# Patient Record
Sex: MALE | Race: WHITE | Employment: OTHER | URBAN - METROPOLITAN AREA
[De-identification: names, ages, dates, MRNs, and addresses within clinical notes are randomized per-mention and may not be internally consistent; named-entity substitution may affect disease eponyms.]

---

## 2022-11-16 ENCOUNTER — APPOINTMENT (OUTPATIENT)
Dept: NON INVASIVE DIAGNOSTICS | Age: 47
DRG: 872 | End: 2022-11-16
Attending: STUDENT IN AN ORGANIZED HEALTH CARE EDUCATION/TRAINING PROGRAM

## 2022-11-16 ENCOUNTER — APPOINTMENT (OUTPATIENT)
Dept: CT IMAGING | Age: 47
DRG: 872 | End: 2022-11-16
Attending: STUDENT IN AN ORGANIZED HEALTH CARE EDUCATION/TRAINING PROGRAM

## 2022-11-16 ENCOUNTER — HOSPITAL ENCOUNTER (INPATIENT)
Age: 47
LOS: 6 days | Discharge: HOME | DRG: 872 | End: 2022-11-22
Attending: STUDENT IN AN ORGANIZED HEALTH CARE EDUCATION/TRAINING PROGRAM | Admitting: HOSPITALIST

## 2022-11-16 DIAGNOSIS — L03.116 CELLULITIS OF LEFT LOWER EXTREMITY: Primary | ICD-10-CM

## 2022-11-16 PROBLEM — L03.90 CELLULITIS: Status: ACTIVE | Noted: 2022-11-16

## 2022-11-16 LAB
ANION GAP SERPL CALC-SCNC: 8 MMOL/L (ref 5–15)
BASOPHILS # BLD: 0 K/UL (ref 0–0.1)
BASOPHILS NFR BLD: 0 % (ref 0–1)
BUN SERPL-MCNC: 14 MG/DL (ref 6–20)
BUN/CREAT SERPL: 18 (ref 12–20)
CA-I BLD-MCNC: 8.9 MG/DL (ref 8.5–10.1)
CHLORIDE SERPL-SCNC: 101 MMOL/L (ref 97–108)
CO2 SERPL-SCNC: 26 MMOL/L (ref 21–32)
CREAT SERPL-MCNC: 0.78 MG/DL (ref 0.7–1.3)
DIFFERENTIAL METHOD BLD: ABNORMAL
EOSINOPHIL # BLD: 0 K/UL (ref 0–0.4)
EOSINOPHIL NFR BLD: 0 % (ref 0–7)
ERYTHROCYTE [DISTWIDTH] IN BLOOD BY AUTOMATED COUNT: 12.4 % (ref 11.5–14.5)
GLUCOSE BLD STRIP.AUTO-MCNC: 104 MG/DL (ref 65–100)
GLUCOSE BLD STRIP.AUTO-MCNC: 115 MG/DL (ref 65–100)
GLUCOSE SERPL-MCNC: 128 MG/DL (ref 65–100)
HCT VFR BLD AUTO: 43.6 % (ref 36.6–50.3)
HGB BLD-MCNC: 15.1 G/DL (ref 12.1–17)
IMM GRANULOCYTES # BLD AUTO: 0.2 K/UL (ref 0–0.04)
IMM GRANULOCYTES NFR BLD AUTO: 1 % (ref 0–0.5)
LACTATE SERPL-SCNC: 1.4 MMOL/L (ref 0.4–2)
LYMPHOCYTES # BLD: 1.1 K/UL (ref 0.8–3.5)
LYMPHOCYTES NFR BLD: 5 % (ref 12–49)
MCH RBC QN AUTO: 29.9 PG (ref 26–34)
MCHC RBC AUTO-ENTMCNC: 34.6 G/DL (ref 30–36.5)
MCV RBC AUTO: 86.3 FL (ref 80–99)
MONOCYTES # BLD: 1.6 K/UL (ref 0–1)
MONOCYTES NFR BLD: 7 % (ref 5–13)
NEUTS SEG # BLD: 18.7 K/UL (ref 1.8–8)
NEUTS SEG NFR BLD: 87 % (ref 32–75)
NRBC # BLD: 0 K/UL (ref 0–0.01)
NRBC BLD-RTO: 0 PER 100 WBC
PERFORMED BY, TECHID: ABNORMAL
PERFORMED BY, TECHID: ABNORMAL
PLATELET # BLD AUTO: 191 K/UL (ref 150–400)
PMV BLD AUTO: 10.9 FL (ref 8.9–12.9)
POTASSIUM SERPL-SCNC: 3.7 MMOL/L (ref 3.5–5.1)
RBC # BLD AUTO: 5.05 M/UL (ref 4.1–5.7)
SODIUM SERPL-SCNC: 135 MMOL/L (ref 136–145)
WBC # BLD AUTO: 21.6 K/UL (ref 4.1–11.1)

## 2022-11-16 PROCEDURE — 74011000636 HC RX REV CODE- 636: Performed by: STUDENT IN AN ORGANIZED HEALTH CARE EDUCATION/TRAINING PROGRAM

## 2022-11-16 PROCEDURE — 96375 TX/PRO/DX INJ NEW DRUG ADDON: CPT

## 2022-11-16 PROCEDURE — 93971 EXTREMITY STUDY: CPT

## 2022-11-16 PROCEDURE — 74011000250 HC RX REV CODE- 250: Performed by: HOSPITALIST

## 2022-11-16 PROCEDURE — 96374 THER/PROPH/DIAG INJ IV PUSH: CPT

## 2022-11-16 PROCEDURE — 80048 BASIC METABOLIC PNL TOTAL CA: CPT

## 2022-11-16 PROCEDURE — 83036 HEMOGLOBIN GLYCOSYLATED A1C: CPT

## 2022-11-16 PROCEDURE — 73701 CT LOWER EXTREMITY W/DYE: CPT

## 2022-11-16 PROCEDURE — 74011000258 HC RX REV CODE- 258: Performed by: HOSPITALIST

## 2022-11-16 PROCEDURE — 83605 ASSAY OF LACTIC ACID: CPT

## 2022-11-16 PROCEDURE — 96361 HYDRATE IV INFUSION ADD-ON: CPT

## 2022-11-16 PROCEDURE — 74011250636 HC RX REV CODE- 250/636: Performed by: HOSPITALIST

## 2022-11-16 PROCEDURE — 85025 COMPLETE CBC W/AUTO DIFF WBC: CPT

## 2022-11-16 PROCEDURE — 65270000029 HC RM PRIVATE

## 2022-11-16 PROCEDURE — 74011250636 HC RX REV CODE- 250/636: Performed by: STUDENT IN AN ORGANIZED HEALTH CARE EDUCATION/TRAINING PROGRAM

## 2022-11-16 PROCEDURE — 99285 EMERGENCY DEPT VISIT HI MDM: CPT

## 2022-11-16 PROCEDURE — 82962 GLUCOSE BLOOD TEST: CPT

## 2022-11-16 PROCEDURE — 36415 COLL VENOUS BLD VENIPUNCTURE: CPT

## 2022-11-16 PROCEDURE — 87040 BLOOD CULTURE FOR BACTERIA: CPT

## 2022-11-16 RX ORDER — MAGNESIUM SULFATE 100 %
4 CRYSTALS MISCELLANEOUS AS NEEDED
Status: DISCONTINUED | OUTPATIENT
Start: 2022-11-16 | End: 2022-11-17 | Stop reason: SDUPTHER

## 2022-11-16 RX ORDER — LISINOPRIL 10 MG/1
10 TABLET ORAL DAILY
Status: DISCONTINUED | OUTPATIENT
Start: 2022-11-17 | End: 2022-11-21

## 2022-11-16 RX ORDER — SODIUM CHLORIDE 0.9 % (FLUSH) 0.9 %
5-40 SYRINGE (ML) INJECTION AS NEEDED
Status: DISCONTINUED | OUTPATIENT
Start: 2022-11-16 | End: 2022-11-22 | Stop reason: HOSPADM

## 2022-11-16 RX ORDER — HYDRALAZINE HYDROCHLORIDE 20 MG/ML
10 INJECTION INTRAMUSCULAR; INTRAVENOUS
Status: DISCONTINUED | OUTPATIENT
Start: 2022-11-16 | End: 2022-11-22 | Stop reason: HOSPADM

## 2022-11-16 RX ORDER — ASCORBIC ACID 250 MG
TABLET ORAL
COMMUNITY

## 2022-11-16 RX ORDER — SODIUM CHLORIDE 0.9 % (FLUSH) 0.9 %
5-40 SYRINGE (ML) INJECTION EVERY 8 HOURS
Status: DISCONTINUED | OUTPATIENT
Start: 2022-11-16 | End: 2022-11-19

## 2022-11-16 RX ORDER — MORPHINE SULFATE 2 MG/ML
2 INJECTION, SOLUTION INTRAMUSCULAR; INTRAVENOUS
Status: DISPENSED | OUTPATIENT
Start: 2022-11-16 | End: 2022-11-18

## 2022-11-16 RX ORDER — ONDANSETRON 2 MG/ML
4 INJECTION INTRAMUSCULAR; INTRAVENOUS
Status: COMPLETED | OUTPATIENT
Start: 2022-11-16 | End: 2022-11-16

## 2022-11-16 RX ORDER — MORPHINE SULFATE 2 MG/ML
2 INJECTION, SOLUTION INTRAMUSCULAR; INTRAVENOUS
Status: DISCONTINUED | OUTPATIENT
Start: 2022-11-16 | End: 2022-11-16

## 2022-11-16 RX ORDER — GLIPIZIDE 5 MG/1
5 TABLET ORAL DAILY
COMMUNITY

## 2022-11-16 RX ORDER — ACETAMINOPHEN 325 MG/1
650 TABLET ORAL
Status: DISCONTINUED | OUTPATIENT
Start: 2022-11-16 | End: 2022-11-22 | Stop reason: HOSPADM

## 2022-11-16 RX ORDER — ATORVASTATIN CALCIUM 20 MG/1
20 TABLET, FILM COATED ORAL DAILY
COMMUNITY

## 2022-11-16 RX ORDER — ONDANSETRON 2 MG/ML
4 INJECTION INTRAMUSCULAR; INTRAVENOUS
Status: DISCONTINUED | OUTPATIENT
Start: 2022-11-16 | End: 2022-11-22 | Stop reason: HOSPADM

## 2022-11-16 RX ORDER — INSULIN LISPRO 100 [IU]/ML
INJECTION, SOLUTION INTRAVENOUS; SUBCUTANEOUS
Status: DISCONTINUED | OUTPATIENT
Start: 2022-11-16 | End: 2022-11-17 | Stop reason: SDUPTHER

## 2022-11-16 RX ORDER — VANCOMYCIN/0.9 % SOD CHLORIDE 1.5G/250ML
1500 PLASTIC BAG, INJECTION (ML) INTRAVENOUS EVERY 12 HOURS
Status: DISCONTINUED | OUTPATIENT
Start: 2022-11-17 | End: 2022-11-21

## 2022-11-16 RX ORDER — ACETAMINOPHEN 650 MG/1
650 SUPPOSITORY RECTAL
Status: DISCONTINUED | OUTPATIENT
Start: 2022-11-16 | End: 2022-11-22 | Stop reason: HOSPADM

## 2022-11-16 RX ORDER — MORPHINE SULFATE 4 MG/ML
4 INJECTION INTRAVENOUS ONCE
Status: COMPLETED | OUTPATIENT
Start: 2022-11-16 | End: 2022-11-16

## 2022-11-16 RX ORDER — AMLODIPINE BESYLATE 5 MG/1
5 TABLET ORAL DAILY
COMMUNITY

## 2022-11-16 RX ORDER — METFORMIN HYDROCHLORIDE 500 MG/1
500 TABLET ORAL
COMMUNITY

## 2022-11-16 RX ORDER — POLYETHYLENE GLYCOL 3350 17 G/17G
17 POWDER, FOR SOLUTION ORAL DAILY PRN
Status: DISCONTINUED | OUTPATIENT
Start: 2022-11-16 | End: 2022-11-22 | Stop reason: HOSPADM

## 2022-11-16 RX ORDER — ONDANSETRON 4 MG/1
4 TABLET, ORALLY DISINTEGRATING ORAL
Status: DISCONTINUED | OUTPATIENT
Start: 2022-11-16 | End: 2022-11-22 | Stop reason: HOSPADM

## 2022-11-16 RX ORDER — LISINOPRIL 40 MG/1
40 TABLET ORAL DAILY
COMMUNITY

## 2022-11-16 RX ADMIN — PIPERACILLIN AND TAZOBACTAM 3.38 G: 3; .375 INJECTION, POWDER, FOR SOLUTION INTRAVENOUS at 17:01

## 2022-11-16 RX ADMIN — MORPHINE SULFATE 4 MG: 4 INJECTION, SOLUTION INTRAMUSCULAR; INTRAVENOUS at 14:24

## 2022-11-16 RX ADMIN — ONDANSETRON 4 MG: 2 INJECTION INTRAMUSCULAR; INTRAVENOUS at 18:16

## 2022-11-16 RX ADMIN — SODIUM CHLORIDE 1000 ML: 9 INJECTION, SOLUTION INTRAVENOUS at 17:01

## 2022-11-16 RX ADMIN — SODIUM CHLORIDE 1000 ML: 9 INJECTION, SOLUTION INTRAVENOUS at 14:22

## 2022-11-16 RX ADMIN — MORPHINE SULFATE 2 MG: 2 INJECTION, SOLUTION INTRAMUSCULAR; INTRAVENOUS at 17:32

## 2022-11-16 RX ADMIN — IOPAMIDOL 100 ML: 755 INJECTION, SOLUTION INTRAVENOUS at 15:14

## 2022-11-16 RX ADMIN — SODIUM CHLORIDE, PRESERVATIVE FREE 10 ML: 5 INJECTION INTRAVENOUS at 17:32

## 2022-11-16 RX ADMIN — VANCOMYCIN HYDROCHLORIDE 2500 MG: 5 INJECTION, POWDER, LYOPHILIZED, FOR SOLUTION INTRAVENOUS at 18:15

## 2022-11-16 RX ADMIN — ONDANSETRON HYDROCHLORIDE 4 MG: 2 SOLUTION INTRAMUSCULAR; INTRAVENOUS at 14:23

## 2022-11-16 RX ADMIN — SODIUM CHLORIDE, PRESERVATIVE FREE 10 ML: 5 INJECTION INTRAVENOUS at 22:43

## 2022-11-16 NOTE — ED TRIAGE NOTES
Started yesterday with lt leg redness and pain, started yesterday with swelling. Pt is a . Hx of DM.

## 2022-11-16 NOTE — H&P
History and Physical    Subjective:   Chief Complaint : left leg reddness since 2 days  Source of information : patient     History of present illness:   47M, very pleasant, h/o DMII and HTN on oral meds with left leg swelling sinbce 2 days    Symptoms started 2 days ago, and worsening slowly, associated with pain- sharp, non radiating and sesation of feeling warm. He was found to be in sepsis on arrival. He is a reuck     ED:  Vancomycin and 1 L bolus    Past Medical History:   Diagnosis Date    Diabetes ([address]     Hypertension      No past surgical history on file. No family history on file. Social History     Tobacco Use    Smoking status: Former     Types: Cigarettes    Smokeless tobacco: Never   Substance Use Topics    Alcohol use: Not on file       Prior to Admission medications    Not on File     No Known Allergies          Review of Systems:  Constitutional: Appetite is good, denies weight loss, no fever, no chills, no night sweats  Eye: No recent visual disturbances, no discharge, no double vision  Ear/nose/mouth/throat : No hearing disturbance, no ear pain, no nasal congestion, no sore throat, no trouble swallowing. Respiratory : No trouble breathing, no cough, no shortness of breath, no hemoptysis, no wheezing  Cardiovascular : No chest pain, no palpitation, no racing of heart, no orthopnea, no paroxysmal nocturnal dyspnea, no peripheral edema  Gastrointestinal : No nausea, no vomiting, no diarrhea, constipation, heartburn, abdominal pain  Genitourinary : No dysuria, no hematuria, no increased frequency, incontinence,  Lymphatics : No swollen glands -Neck, axillary, inguinal  Endocrine : No excessive thirst, no polyuria no cold intolerance, no heat intolerance.   Immunologic : No hives, urticaria, no seasonal allergies,   Musculoskeletal : No joint swelling, pain, effusion,  no back pain, no neck pain,   Integumentary : ++++ leg pain  Hematology : No petechiae, No easy bruising,  No tendency to bleed easy  Neurology : Denies change in mental status, no abnormal balance, no headache, no confusion, numbness, tingling,  Psychiatric : No mood swings, no anxiety, depression    Vitals:   Visit Vitals  /71 (BP 1 Location: Right upper arm, BP Patient Position: At rest)   Pulse (!) 102   Temp 99 °F (37.2 °C)   Resp 20   Ht 6' 1\" (1.854 m)   Wt 154.2 kg (340 lb)   SpO2 98%   BMI 44.86 kg/m²       Physical Exam  Vitals and nursing note reviewed. Constitutional:       General: He is not in acute distress. Appearance: He is not ill-appearing, toxic-appearing or diaphoretic. HENT:      Head: Normocephalic and atraumatic. Cardiovascular:      Rate and Rhythm: Normal rate and regular rhythm. Heart sounds: Normal heart sounds. Pulmonary:      Effort: Pulmonary effort is normal.      Breath sounds: Normal breath sounds. Abdominal:      Palpations: Abdomen is soft. Tenderness: There is no abdominal tenderness. Musculoskeletal:      Cervical back: Normal range of motion and neck supple. Right lower leg: No tenderness. No edema. Left lower leg: No tenderness. No edema. Comments: Redness noted from the mid lower leg down with tenderness to palpation. Patient has tenderness to palpation tracking from the popliteal all the way up to the femoral vein. Tenderness also noted in the calf muscle and mildly anterior to the leg. There is no thigh tenderness anywhere aside from femoral vein. Skin:     General: Skin is warm and dry. Neurological:      Mental Status: He is alert and oriented to person, place, and time. Data Review:   Recent Results (from the past 24 hour(s))   CBC WITH AUTOMATED DIFF    Collection Time: 11/16/22  1:30 PM   Result Value Ref Range    WBC 21.6 (H) 4.1 - 11.1 K/uL    RBC 5.05 4. 10 - 5.70 M/uL    HGB 15.1 12.1 - 17.0 g/dL    HCT 43.6 36.6 - 50.3 %    MCV 86.3 80.0 - 99.0 FL    MCH 29.9 26.0 - 34.0 PG    MCHC 34.6 30.0 - 36.5 g/dL    RDW 12.4 11.5 - 14.5 %    PLATELET 279 693 - 439 K/uL    MPV 10.9 8.9 - 12.9 FL    NRBC 0.0 0.0  WBC    ABSOLUTE NRBC 0.00 0.00 - 0.01 K/uL    NEUTROPHILS 87 (H) 32 - 75 %    LYMPHOCYTES 5 (L) 12 - 49 %    MONOCYTES 7 5 - 13 %    EOSINOPHILS 0 0 - 7 %    BASOPHILS 0 0 - 1 %    IMMATURE GRANULOCYTES 1 (H) 0 - 0.5 %    ABS. NEUTROPHILS 18.7 (H) 1.8 - 8.0 K/UL    ABS. LYMPHOCYTES 1.1 0.8 - 3.5 K/UL    ABS. MONOCYTES 1.6 (H) 0.0 - 1.0 K/UL    ABS. EOSINOPHILS 0.0 0.0 - 0.4 K/UL    ABS. BASOPHILS 0.0 0.0 - 0.1 K/UL    ABS. IMM.  GRANS. 0.2 (H) 0.00 - 0.04 K/UL    DF AUTOMATED     METABOLIC PANEL, BASIC    Collection Time: 11/16/22  1:30 PM   Result Value Ref Range    Sodium 135 (L) 136 - 145 mmol/L    Potassium 3.7 3.5 - 5.1 mmol/L    Chloride 101 97 - 108 mmol/L    CO2 26 21 - 32 mmol/L    Anion gap 8 5 - 15 mmol/L    Glucose 128 (H) 65 - 100 mg/dL    BUN 14 6 - 20 mg/dL    Creatinine 0.78 0.70 - 1.30 mg/dL    BUN/Creatinine ratio 18 12 - 20      eGFR >60 >60 ml/min/1.73m2    Calcium 8.9 8.5 - 10.1 mg/dL   LACTIC ACID    Collection Time: 11/16/22  1:30 PM   Result Value Ref Range    Lactic acid 1.4 0.4 - 2.0 mmol/L             Assessment and Plan :     (1) non-purulent cellulitis     (2) Sepsis     (3) HTN    (4) DMII    PLAN:  Vanc and zosyn  Morphine for pain  US leg to rule out DVT  SSI for insulin'  PRN hydralazine    DISPO: dc home tomrorow      Signed By: Darcy Coe MD     November 16, 2022

## 2022-11-16 NOTE — ED NOTES
TRANSFER - OUT REPORT:    Verbal report given to Glenny(name) on Kiersten Garduno  being transferred to [facility](unit) for routine progression of care       Report consisted of patients Situation, Background, Assessment and   Recommendations(SBAR). Information from the following report(s) SBAR was reviewed with the receiving nurse. Lines:   Peripheral IV 11/16/22 Anterior;Left;Proximal Forearm (Active)        Opportunity for questions and clarification was provided.       Patient transported with:   [employer]

## 2022-11-16 NOTE — PROGRESS NOTES
Vancomycin Dosing Consult  Eileen Reyes is a 56 y.o. male with leg cellulitis / sepsis. Pharmacy was consulted by Dr. Caryl Eid to dose and monitor Vancomycin (for 5 days). Today is day 1. Antibiotic regimen: Vancomycin + Zosyn    Temp (24hrs), Av.6 °F (37 °C), Min:98.2 °F (36.8 °C), Max:99 °F (37.2 °C)    Recent Labs     22  1330   WBC 21.6*     Recent Labs     22  1330   CREA 0.78   BUN 14     Estimated Creatinine Clearance: 183.4 mL/min (based on SCr of 0.78 mg/dL). ml/min  Concomitant nephrotoxic drugs: None    Cultures:    blood: pending    MRSA Swab: Not ordered, patient already received first dose of vancomycin    Target range: AUC/ESTRADA 400-600      Ht Readings from Last 1 Encounters:   22 185.4 cm (73\")     Wt Readings from Last 1 Encounters:   22 154.2 kg (340 lb)     Ideal body weight: 79.9 kg (176 lb 2.4 oz)  Adjusted ideal body weight: 109.6 kg (241 lb 11 oz)        Assessment/Plan:   Afebrile, leukocytosis  Started Vancomycin 2500mg IV x1, then 1500mg IV q12h  Pharmacy will order a random level tomorrow at 1600.   Antimicrobial stop date TBD

## 2022-11-16 NOTE — PROGRESS NOTES
Reason for Admission:  Cellulitis                     RUR Score:  4%                   Plan for utilizing home health:   None @ this time/uses no DME. PCP: First and Last name:  Martha Yap     Name of Practice:    Are you a current patient: Yes/No: Yes   Approximate date of last visit: 2 mos ago. Can you participate in a virtual visit with your PCP: Yes/Call/Has cell phone. Current Advanced Directive/Advance Care Plan: Full Code      Healthcare Decision Maker:     Primary Decision Maker: Gomez El - Ex-Spouse - [phone]                  Transition of Care Plan:                    D/C Plan is home & pt will have [facility] transport. Send Rxs to [facility] upon discharge.

## 2022-11-16 NOTE — ED PROVIDER NOTES
[address]  EMERGENCY DEPARTMENT ENCOUNTER NOTE        Date: 11/16/2022  Patient Name: Ernestina White      History of Presenting Illness     Chief Complaint   Patient presents with    Leg Pain       History Provided By: Patient    HPI: Ernestina White, 56 y.o. male with PMH of DM and HTN who is a long-distance  comes to the ED with 2 days history of lower extremity swelling and redness. Patient report that symptoms started 2 days ago and has been slowly progressing, this associated with sensation of feeling warm. Mild to moderate severity, no known aggravating leaving factors without association symptoms. He is denying any shortness of breath, dull pain, nausea or vomiting. No prior history of DVT or PE. He is not on any anticoagulation. There are no other complaints, changes, or physical findings at this time.     PCP: None    Current Facility-Administered Medications   Medication Dose Route Frequency Provider Last Rate Last Admin    vancomycin (VANCOCIN) 2,500 mg in 0.9% sodium chloride 500 mL IVPB  2,500 mg IntraVENous ONCE Radha Bowers MD        sodium chloride 0.9 % bolus infusion 1,000 mL  1,000 mL IntraVENous ONCE Radha Bowers MD        sodium chloride (NS) flush 5-40 mL  5-40 mL IntraVENous Q8H Emmanuelle Vera MD        sodium chloride (NS) flush 5-40 mL  5-40 mL IntraVENous PRN Sam Lanza MD        acetaminophen (TYLENOL) tablet 650 mg  650 mg Oral Q6H PRN Sam Lanza MD        Or    acetaminophen (TYLENOL) suppository 650 mg  650 mg Rectal Q6H PRN Sam Lanza MD        polyethylene glycol (MIRALAX) packet 17 g  17 g Oral DAILY PRN Sam Lanza MD        ondansetron (ZOFRAN ODT) tablet 4 mg  4 mg Oral Q8H PRN Sam Lanza MD        Or    ondansetron [facility]) injection 4 mg  4 mg IntraVENous Q6H PRN Sam Lanza MD           Past History     Past Medical History:  Past Medical History:   Diagnosis Date    Diabetes ([facility] [address]     Hypertension        Past Surgical History:  No past surgical history on file. Family History:  No family history on file. Social History:  Social History     Tobacco Use    Smoking status: Former     Types: Cigarettes    Smokeless tobacco: Never       Allergies:  No Known Allergies      Review of Systems     Review of Systems    A 10 point review of system was performed and was negative except as noted above in HPI    Physical Exam     Physical Exam  Vitals and nursing note reviewed. Constitutional:       General: He is not in acute distress. Appearance: He is not ill-appearing, toxic-appearing or diaphoretic. HENT:      Head: Normocephalic and atraumatic. Cardiovascular:      Rate and Rhythm: Normal rate and regular rhythm. Heart sounds: Normal heart sounds. Pulmonary:      Effort: Pulmonary effort is normal.      Breath sounds: Normal breath sounds. Abdominal:      Palpations: Abdomen is soft. Tenderness: There is no abdominal tenderness. Musculoskeletal:      Cervical back: Normal range of motion and neck supple. Right lower leg: No tenderness. No edema. Left lower leg: No tenderness. No edema. Comments: Redness noted from the mid lower leg down with tenderness to palpation. Patient has tenderness to palpation tracking from the popliteal all the way up to the femoral vein. Tenderness also noted in the calf muscle and mildly anterior to the leg. There is no thigh tenderness anywhere aside from femoral vein. Skin:     General: Skin is warm and dry. Neurological:      Mental Status: He is alert and oriented to person, place, and time. Lab and Diagnostic Study Results     Labs -     Recent Results (from the past 12 hour(s))   CBC WITH AUTOMATED DIFF    Collection Time: 11/16/22  1:30 PM   Result Value Ref Range    WBC 21.6 (H) 4.1 - 11.1 K/uL    RBC 5.05 4. 10 - 5.70 M/uL    HGB 15.1 12.1 - 17.0 g/dL    HCT 43.6 36.6 - 50.3 % MCV 86.3 80.0 - 99.0 FL    MCH 29.9 26.0 - 34.0 PG    MCHC 34.6 30.0 - 36.5 g/dL    RDW 12.4 11.5 - 14.5 %    PLATELET 385 603 - 295 K/uL    MPV 10.9 8.9 - 12.9 FL    NRBC 0.0 0.0  WBC    ABSOLUTE NRBC 0.00 0.00 - 0.01 K/uL    NEUTROPHILS 87 (H) 32 - 75 %    LYMPHOCYTES 5 (L) 12 - 49 %    MONOCYTES 7 5 - 13 %    EOSINOPHILS 0 0 - 7 %    BASOPHILS 0 0 - 1 %    IMMATURE GRANULOCYTES 1 (H) 0 - 0.5 %    ABS. NEUTROPHILS 18.7 (H) 1.8 - 8.0 K/UL    ABS. LYMPHOCYTES 1.1 0.8 - 3.5 K/UL    ABS. MONOCYTES 1.6 (H) 0.0 - 1.0 K/UL    ABS. EOSINOPHILS 0.0 0.0 - 0.4 K/UL    ABS. BASOPHILS 0.0 0.0 - 0.1 K/UL    ABS. IMM. GRANS. 0.2 (H) 0.00 - 0.04 K/UL    DF AUTOMATED     METABOLIC PANEL, BASIC    Collection Time: 11/16/22  1:30 PM   Result Value Ref Range    Sodium 135 (L) 136 - 145 mmol/L    Potassium 3.7 3.5 - 5.1 mmol/L    Chloride 101 97 - 108 mmol/L    CO2 26 21 - 32 mmol/L    Anion gap 8 5 - 15 mmol/L    Glucose 128 (H) 65 - 100 mg/dL    BUN 14 6 - 20 mg/dL    Creatinine 0.78 0.70 - 1.30 mg/dL    BUN/Creatinine ratio 18 12 - 20      eGFR >60 >60 ml/min/1.73m2    Calcium 8.9 8.5 - 10.1 mg/dL   LACTIC ACID    Collection Time: 11/16/22  1:30 PM   Result Value Ref Range    Lactic acid 1.4 0.4 - 2.0 mmol/L       Radiologic Studies -   [unfilled]  CT Results  (Last 48 hours)                 11/16/22 1513  CT LOW EXT LT W CONT Final result    Impression:  Mild subcutaneous edema surrounding the left lower leg. No evidence of a focal   drainable fluid collection. Narrative:  EXAM: CT LOW EXT LT W CONT       INDICATION: Left lower extremity pain and redness        COMPARISON: None       CONTRAST: 100 mL of Isovue-300. TECHNIQUE: Helical CT of the left lower extremity from the distal femur to the   toes during uneventful rapid bolus intravenous contrast administration. Coronal   and Sagittal reformats were generated. Images reviewed in soft tissue and bone   windows.  CT dose reduction was achieved through the use of a standardized   protocol tailored for this examination and automatic exposure control for dose   modulation. FINDINGS: Bones: Normal bone mineralization. No fracture, dislocation, or   periosteal reaction. No evidence of osteomyelitis. Joint fluid: None. Articulations: No significant osteoarthritis. No evidence of inflammatory   arthritis. Tendons: No full-thickness tendon tear. Muscles: No intramuscular hematoma. No focal atrophy. Soft tissue mass: No mass. There is mild subcutaneous edema surrounding the left   lower leg. No evidence of a focal drainable fluid collection. No subcutaneous   emphysema. CXR Results  (Last 48 hours)      None            Medical Decision Making and ED Course   - I am the first and primary provider for this patient AND AM THE PRIMARY PROVIDER OF RECORD. - I reviewed the vital signs, available nursing notes, past medical history, past surgical history, family history and social history. - Initial assessment performed. The patients presenting problems have been discussed, and the staff are in agreement with the care plan formulated and outlined with them. I have encouraged them to ask questions as they arise throughout their visit. Vital Signs-Reviewed the patient's vital signs. Patient Vitals for the past 24 hrs:   Temp Pulse Resp BP SpO2   11/16/22 1528 -- (!) 102 -- 130/71 98 %   11/16/22 1443 -- (!) 102 -- 128/82 97 %   11/16/22 1418 -- (!) 105 -- 126/71 98 %   11/16/22 1348 -- (!) 107 -- 118/81 97 %   11/16/22 1303 -- (!) 105 20 (!) 135/90 97 %   11/16/22 1203 99 °F (37.2 °C) (!) 107 14 124/83 96 %       Records Reviewed: Nursing Notes    Provider Notes (Medical Decision Making):       ED Course as of 11/16/22 1603   Wed Nov 16, 2022   1215 Patient is a 81-year-old male who presents to the ED with 2 days history of leg pain. He does have tenderness tracking along the femoral vein.   Concern for DVT versus cellulitis. No pain out of proportion to exam.  Will intravenously rehydrate the patient treat his symptoms with Zofran morphine and will get CBC, chemistry and venous Doppler of the left lower extremity. [AA]   1250 Spoke to the vascular study tech. Negative study for DVT. Will get CT of the lower extremity with contrast. [AA]   1542 CT LOW EXT LT W CONT  Cellulitis. Setting of leukocytosis, tachycardia, and history of diabetes we will admit the patient to the hospital for treatment of cellulitis. [AA]      ED Course User Index  [AA] Asya Humphries MD         Procedures and Critical Care       Performed by: Rupal Root MD  PROCEDURES:  Critical Care  Performed by: Asya Humphries MD  Authorized by: Asya Humphries MD     Critical care provider statement:     Critical care time (minutes):  30    Critical care time was exclusive of:  Separately billable procedures and treating other patients    Critical care was necessary to treat or prevent imminent or life-threatening deterioration of the following conditions:  Sepsis    Critical care was time spent personally by me on the following activities:  Development of treatment plan with patient or surrogate, discussions with consultants, evaluation of patient's response to treatment, examination of patient, obtaining history from patient or surrogate, ordering and performing treatments and interventions, ordering and review of laboratory studies, ordering and review of radiographic studies and re-evaluation of patient's condition    I assumed direction of critical care for this patient from another provider in my specialty: no      Care discussed with: admitting provider         Diagnosis     Clinical Impression:   1. Cellulitis of left lower extremity          Disposition     Disposition: Condition stable    Admitted    Attestations: Rupal Root MD    Please note that this dictation was completed with [employer], the [employer] voice recognition software. Quite often unanticipated grammatical, syntax, homophones, and other interpretive errors are inadvertently transcribed by the computer software. Please disregard these errors. Please excuse any errors that have escaped final proofreading. Thank you.

## 2022-11-16 NOTE — ACP (ADVANCE CARE PLANNING)
Advance Care Planning   Healthcare Decision Maker:       Primary Decision Maker: Dahlia Wood - Ex-Spouse - [phone]

## 2022-11-17 LAB
ANION GAP SERPL CALC-SCNC: 6 MMOL/L (ref 5–15)
BUN SERPL-MCNC: 15 MG/DL (ref 6–20)
BUN/CREAT SERPL: 17 (ref 12–20)
CA-I BLD-MCNC: 8.2 MG/DL (ref 8.5–10.1)
CHLORIDE SERPL-SCNC: 105 MMOL/L (ref 97–108)
CO2 SERPL-SCNC: 29 MMOL/L (ref 21–32)
CREAT SERPL-MCNC: 0.87 MG/DL (ref 0.7–1.3)
CREAT SERPL-MCNC: 0.88 MG/DL (ref 0.7–1.3)
ERYTHROCYTE [DISTWIDTH] IN BLOOD BY AUTOMATED COUNT: 12.3 % (ref 11.5–14.5)
EST. AVERAGE GLUCOSE BLD GHB EST-MCNC: 123 MG/DL
GLUCOSE BLD STRIP.AUTO-MCNC: 100 MG/DL (ref 65–100)
GLUCOSE BLD STRIP.AUTO-MCNC: 114 MG/DL (ref 65–100)
GLUCOSE BLD STRIP.AUTO-MCNC: 121 MG/DL (ref 65–100)
GLUCOSE BLD STRIP.AUTO-MCNC: 148 MG/DL (ref 65–100)
GLUCOSE SERPL-MCNC: 118 MG/DL (ref 65–100)
HBA1C MFR BLD: 5.9 % (ref 4–5.6)
HCT VFR BLD AUTO: 41.8 % (ref 36.6–50.3)
HGB BLD-MCNC: 14.4 G/DL (ref 12.1–17)
MCH RBC QN AUTO: 30.4 PG (ref 26–34)
MCHC RBC AUTO-ENTMCNC: 34.4 G/DL (ref 30–36.5)
MCV RBC AUTO: 88.2 FL (ref 80–99)
NRBC # BLD: 0 K/UL (ref 0–0.01)
NRBC BLD-RTO: 0 PER 100 WBC
PERFORMED BY, TECHID: ABNORMAL
PERFORMED BY, TECHID: NORMAL
PLATELET # BLD AUTO: 196 K/UL (ref 150–400)
PMV BLD AUTO: 11.6 FL (ref 8.9–12.9)
POTASSIUM SERPL-SCNC: 3.8 MMOL/L (ref 3.5–5.1)
PROCALCITONIN SERPL-MCNC: 0.56 NG/ML
RBC # BLD AUTO: 4.74 M/UL (ref 4.1–5.7)
SODIUM SERPL-SCNC: 140 MMOL/L (ref 136–145)
VANCOMYCIN SERPL-MCNC: 5.1 UG/ML
WBC # BLD AUTO: 14 K/UL (ref 4.1–11.1)

## 2022-11-17 PROCEDURE — 74011250636 HC RX REV CODE- 250/636: Performed by: INTERNAL MEDICINE

## 2022-11-17 PROCEDURE — 85027 COMPLETE CBC AUTOMATED: CPT

## 2022-11-17 PROCEDURE — 36415 COLL VENOUS BLD VENIPUNCTURE: CPT

## 2022-11-17 PROCEDURE — 74011000258 HC RX REV CODE- 258: Performed by: HOSPITALIST

## 2022-11-17 PROCEDURE — 80202 ASSAY OF VANCOMYCIN: CPT

## 2022-11-17 PROCEDURE — 74011000250 HC RX REV CODE- 250: Performed by: HOSPITALIST

## 2022-11-17 PROCEDURE — 74011636637 HC RX REV CODE- 636/637: Performed by: HOSPITALIST

## 2022-11-17 PROCEDURE — 80048 BASIC METABOLIC PNL TOTAL CA: CPT

## 2022-11-17 PROCEDURE — 82962 GLUCOSE BLOOD TEST: CPT

## 2022-11-17 PROCEDURE — 84145 PROCALCITONIN (PCT): CPT

## 2022-11-17 PROCEDURE — 65270000029 HC RM PRIVATE

## 2022-11-17 PROCEDURE — 74011250636 HC RX REV CODE- 250/636: Performed by: HOSPITALIST

## 2022-11-17 PROCEDURE — 74011250637 HC RX REV CODE- 250/637: Performed by: HOSPITALIST

## 2022-11-17 RX ORDER — ENOXAPARIN SODIUM 100 MG/ML
40 INJECTION SUBCUTANEOUS EVERY 12 HOURS
Status: DISCONTINUED | OUTPATIENT
Start: 2022-11-17 | End: 2022-11-22 | Stop reason: HOSPADM

## 2022-11-17 RX ORDER — ENOXAPARIN SODIUM 100 MG/ML
40 INJECTION SUBCUTANEOUS EVERY 24 HOURS
Status: DISCONTINUED | OUTPATIENT
Start: 2022-11-17 | End: 2022-11-17

## 2022-11-17 RX ORDER — INSULIN LISPRO 100 [IU]/ML
INJECTION, SOLUTION INTRAVENOUS; SUBCUTANEOUS
Status: DISCONTINUED | OUTPATIENT
Start: 2022-11-17 | End: 2022-11-22 | Stop reason: HOSPADM

## 2022-11-17 RX ORDER — DEXTROSE MONOHYDRATE 100 MG/ML
0-250 INJECTION, SOLUTION INTRAVENOUS AS NEEDED
Status: DISCONTINUED | OUTPATIENT
Start: 2022-11-17 | End: 2022-11-22 | Stop reason: HOSPADM

## 2022-11-17 RX ORDER — MAGNESIUM SULFATE 100 %
4 CRYSTALS MISCELLANEOUS AS NEEDED
Status: DISCONTINUED | OUTPATIENT
Start: 2022-11-17 | End: 2022-11-22 | Stop reason: HOSPADM

## 2022-11-17 RX ADMIN — ACETAMINOPHEN 650 MG: 325 TABLET ORAL at 06:16

## 2022-11-17 RX ADMIN — SODIUM CHLORIDE, PRESERVATIVE FREE 10 ML: 5 INJECTION INTRAVENOUS at 21:21

## 2022-11-17 RX ADMIN — SODIUM CHLORIDE, PRESERVATIVE FREE 10 ML: 5 INJECTION INTRAVENOUS at 16:13

## 2022-11-17 RX ADMIN — PIPERACILLIN AND TAZOBACTAM 3.38 G: 3; .375 INJECTION, POWDER, FOR SOLUTION INTRAVENOUS at 08:22

## 2022-11-17 RX ADMIN — INSULIN LISPRO 3 UNITS: 100 INJECTION, SOLUTION INTRAVENOUS; SUBCUTANEOUS at 12:27

## 2022-11-17 RX ADMIN — SODIUM CHLORIDE, PRESERVATIVE FREE 10 ML: 5 INJECTION INTRAVENOUS at 06:23

## 2022-11-17 RX ADMIN — PIPERACILLIN AND TAZOBACTAM 3.38 G: 3; .375 INJECTION, POWDER, FOR SOLUTION INTRAVENOUS at 01:07

## 2022-11-17 RX ADMIN — PIPERACILLIN AND TAZOBACTAM 3.38 G: 3; .375 INJECTION, POWDER, FOR SOLUTION INTRAVENOUS at 16:13

## 2022-11-17 RX ADMIN — LISINOPRIL 10 MG: 10 TABLET ORAL at 08:21

## 2022-11-17 RX ADMIN — Medication 1500 MG: at 06:12

## 2022-11-17 RX ADMIN — ENOXAPARIN SODIUM 40 MG: 100 INJECTION SUBCUTANEOUS at 16:12

## 2022-11-17 RX ADMIN — MORPHINE SULFATE 2 MG: 2 INJECTION, SOLUTION INTRAMUSCULAR; INTRAVENOUS at 01:15

## 2022-11-17 NOTE — PROGRESS NOTES
Vancomycin Dosing Consult  Kiersten Garduno is a 56 y.o. male with a skin and soft tissue infection of the left lower extremity/sepsis. Pharmacy was consulted by Dr. Dariel Butler to dose and monitor Vancomycin. Today is day 2.     Antibiotic regimen: Vancomycin + Zosyn    Temp (24hrs), Av °F (36.7 °C), Min:97.2 °F (36.2 °C), Max:98.4 °F (36.9 °C)    Recent Labs     22  1330   WBC 21.6*     Recent Labs     22  1532 22  1330   CREA 0.88  0.87 0.78   BUN 15 14         Est CrCl: 162.6 ml/min  Concomitant nephrotoxic drugs: None    Cultures:   : Blood - NGTD    MRSA Swab: Pending    Target range: AUC/ESTRADA 400-600    Last Level:  5.1 mcg/mL       Assessment/Plan:   Afebrile, leukocytosis  Continue regimen of vancomycin 1500mg IV q 12 hours; predicts AUC ~ 490  Pharmacy to order a level  @ 0500 and adjust as indicated  Antimicrobial stop date TBD

## 2022-11-17 NOTE — PROGRESS NOTES
Pharmacist Review and Automatic Dose Adjustment of Prophylactic Enoxaparin    *Review reason for admission/hospital problem list*    The reviewing pharmacist has made an adjustment to the ordered enoxaparin dose or converted to UFH per the approved [facility] protocol and table as identified below. Hollie Ware is a 56 y.o. male. No lab exists for component: CREATININE    Estimated Creatinine Clearance: 183.4 mL/min (based on SCr of 0.78 mg/dL). Height:   Ht Readings from Last 1 Encounters:   11/16/22 185.4 cm (73\")     Weight:  Wt Readings from Last 1 Encounters:   11/16/22 154.2 kg (340 lb)               Plan: Based upon the patient's weight and renal function, the ordered enoxaparin dose of 40 mg q24h has been changed/converted to 40 mg q12h.        Thank you,  Kahlil Li, PHARMD

## 2022-11-17 NOTE — PROGRESS NOTES
Problem: Pain  Goal: *Control of Pain  Outcome: Progressing Towards Goal     Problem: Falls - Risk of  Goal: *Absence of Falls  Description: Document Arielle Fall Risk and appropriate interventions in the flowsheet.   Outcome: Progressing Towards Goal  Note: Fall Risk Interventions:            Medication Interventions: Bed/chair exit alarm, Patient to call before getting OOB

## 2022-11-17 NOTE — PROGRESS NOTES
[address] [place] [address] Hospitalist Progress Note  Thierry Dupree MD  Date:2022       Room:[facility]  Patient Sandi Byrnes     YOB: 1975     Age:47 y.o.    22 admission course  47M, very pleasant, h/o DMII and HTN on oral meds with left leg swelling sinbce 2 days   Symptoms started 2 days ago, and worsening slowly, associated with pain- sharp, non radiating and sesation of feeling warm. He was found to be in sepsis on arrival. He is a      no new complaints, tolerating medications well  Improving left lower extremity, yet presently unable to bear weight  Continue iv antibiotics    Subjective    Subjective:  Symptoms:  Stable. Review of Systems   All other systems reviewed and are negative. Objective         Vitals Last 24 Hours:  TEMPERATURE:  Temp  Av.3 °F (36.8 °C)  Min: 98 °F (36.7 °C)  Max: 98.6 °F (37 °C)  RESPIRATIONS RANGE: Resp  Av  Min: 16  Max: 19  PULSE OXIMETRY RANGE: SpO2  Av.7 %  Min: 95 %  Max: 100 %  PULSE RANGE: Pulse  Av.4  Min: 90  Max: 105  BLOOD PRESSURE RANGE: Systolic (62SXE), THC:198 , Min:118 , EFF:759   ; Diastolic (59DRU), CGO:54, Min:71, Max:86    I/O (24Hr): Intake/Output Summary (Last 24 hours) at 2022 1354  Last data filed at 2022 1155  Gross per 24 hour   Intake 1700 ml   Output 900 ml   Net 800 ml     Objective:  General Appearance:  Comfortable. Vital signs: (most recent): Blood pressure 130/80, pulse 90, temperature 98 °F (36.7 °C), resp. rate 19, height 6' 1\" (1.854 m), weight 154.2 kg (340 lb), SpO2 95 %. Physical Exam  Vitals and nursing note reviewed. Constitutional:       General: He is not in acute distress. Appearance: He is not ill-appearing, toxic-appearing or diaphoretic. HENT:      Head: Normocephalic and atraumatic. Cardiovascular:      Rate and Rhythm: Normal rate and regular rhythm. Heart sounds: Normal heart sounds.    Pulmonary:      Effort: Pulmonary effort is normal. Breath sounds: Normal breath sounds. Abdominal:      Palpations: Abdomen is soft. Tenderness: There is no abdominal tenderness. Musculoskeletal:      Cervical back: Normal range of motion and neck supple. Right lower leg: No tenderness. No edema. Left lower leg: No tenderness. No edema. Comments: Redness noted from the mid lower leg down with tenderness to palpation. Patient has tenderness to palpation tracking from the popliteal all the way up to the femoral vein. Tenderness also noted in the calf muscle and mildly anterior to the leg. There is no thigh tenderness anywhere aside from femoral vein. Skin:     General: Skin is warm and dry. Neurological:      Mental Status: He is alert and oriented to person, place, and time. Labs/Imaging/Diagnostics    Labs:  CBC:  Recent Labs     11/16/22  1330   WBC 21.6*   RBC 5.05   HGB 15.1   HCT 43.6   MCV 86.3   RDW 12.4        CHEMISTRIES:  Recent Labs     11/16/22  1330   *   K 3.7      CO2 26   BUN 14   CA 8.9   PT/INR:No results for input(s): INR, INREXT in the last 72 hours. No lab exists for component: PROTIME  APTT:No results for input(s): APTT in the last 72 hours. LIVER PROFILE:No results for input(s): AST, ALT in the last 72 hours. No lab exists for component: BILIDIR, BILITOT, ALKPHOS  No results found for: ALT, AST, GGT, GGTP, AP, APIT, APX, CBIL, TBIL, TBILI    Imaging Last 24 Hours:  CT LOW EXT LT W CONT    Result Date: 11/16/2022  EXAM: CT LOW EXT LT W CONT INDICATION: Left lower extremity pain and redness COMPARISON: None CONTRAST: 100 mL of Isovue-300. TECHNIQUE: Helical CT of the left lower extremity from the distal femur to the toes during uneventful rapid bolus intravenous contrast administration. Coronal and Sagittal reformats were generated. Images reviewed in soft tissue and bone windows.  CT dose reduction was achieved through the use of a standardized protocol tailored for this examination and automatic exposure control for dose modulation. FINDINGS: Bones: Normal bone mineralization. No fracture, dislocation, or periosteal reaction. No evidence of osteomyelitis. Joint fluid: None. Articulations: No significant osteoarthritis. No evidence of inflammatory arthritis. Tendons: No full-thickness tendon tear. Muscles: No intramuscular hematoma. No focal atrophy. Soft tissue mass: No mass. There is mild subcutaneous edema surrounding the left lower leg. No evidence of a focal drainable fluid collection. No subcutaneous emphysema. Mild subcutaneous edema surrounding the left lower leg. No evidence of a focal drainable fluid collection. Assessment//Plan   Active Problems:    Cellulitis (11/16/2022)    CT Results  (Last 48 hours)                 11/16/22 1513  CT LOW EXT LT W CONT Final result    Impression:  Mild subcutaneous edema surrounding the left lower leg. No evidence of a focal   drainable fluid collection. Narrative:  EXAM: CT LOW EXT LT W CONT       INDICATION: Left lower extremity pain and redness        COMPARISON: None       CONTRAST: 100 mL of Isovue-300. TECHNIQUE: Helical CT of the left lower extremity from the distal femur to the   toes during uneventful rapid bolus intravenous contrast administration. Coronal   and Sagittal reformats were generated. Images reviewed in soft tissue and bone   windows. CT dose reduction was achieved through the use of a standardized   protocol tailored for this examination and automatic exposure control for dose   modulation. FINDINGS: Bones: Normal bone mineralization. No fracture, dislocation, or   periosteal reaction. No evidence of osteomyelitis. Joint fluid: None. Articulations: No significant osteoarthritis. No evidence of inflammatory   arthritis. Tendons: No full-thickness tendon tear. Muscles: No intramuscular hematoma. No focal atrophy. Soft tissue mass: No mass.  There is mild subcutaneous edema surrounding the left   lower leg. No evidence of a focal drainable fluid collection. No subcutaneous   emphysema. Assessment & Plan    11/16/22 admission course  47M, very pleasant, h/o DMII and HTN on oral meds with left leg swelling sinbce 2 days   Symptoms started 2 days ago, and worsening slowly, associated with pain- sharp, non radiating and sesation of feeling warm. He was found to be in sepsis on arrival. He is a     96/18/81 no new complaints, tolerating medications well  Improving left lower extremity, yet presently unable to bear weight  Continue iv antibiotics    MICROBIOLOGY    11/16/22 Blood  Pending    ASSESSMENT AND PLAN    1) Complex soft tissue infection of the left lower extremity.      Vancomycin and Zosyn for now    2) Diabetes mellitus     Sliding scale lispro ACHS    3) DVT prophylaxis with enoxaparin        Electronically signed by Magy Salguero MD on 11/17/2022 at 1:54 PM

## 2022-11-18 LAB
ANION GAP SERPL CALC-SCNC: 5 MMOL/L (ref 5–15)
BUN SERPL-MCNC: 10 MG/DL (ref 6–20)
BUN/CREAT SERPL: 14 (ref 12–20)
CA-I BLD-MCNC: 8.7 MG/DL (ref 8.5–10.1)
CHLORIDE SERPL-SCNC: 110 MMOL/L (ref 97–108)
CO2 SERPL-SCNC: 28 MMOL/L (ref 21–32)
CREAT SERPL-MCNC: 0.71 MG/DL (ref 0.7–1.3)
ERYTHROCYTE [DISTWIDTH] IN BLOOD BY AUTOMATED COUNT: 12.3 % (ref 11.5–14.5)
GLUCOSE BLD STRIP.AUTO-MCNC: 115 MG/DL (ref 65–100)
GLUCOSE BLD STRIP.AUTO-MCNC: 123 MG/DL (ref 65–100)
GLUCOSE BLD STRIP.AUTO-MCNC: 127 MG/DL (ref 65–100)
GLUCOSE BLD STRIP.AUTO-MCNC: 130 MG/DL (ref 65–100)
GLUCOSE SERPL-MCNC: 102 MG/DL (ref 65–100)
HCT VFR BLD AUTO: 42.6 % (ref 36.6–50.3)
HGB BLD-MCNC: 14.9 G/DL (ref 12.1–17)
MCH RBC QN AUTO: 30.5 PG (ref 26–34)
MCHC RBC AUTO-ENTMCNC: 35 G/DL (ref 30–36.5)
MCV RBC AUTO: 87.1 FL (ref 80–99)
NRBC # BLD: 0 K/UL (ref 0–0.01)
NRBC BLD-RTO: 0 PER 100 WBC
PERFORMED BY, TECHID: ABNORMAL
PLATELET # BLD AUTO: 216 K/UL (ref 150–400)
PMV BLD AUTO: 11 FL (ref 8.9–12.9)
POTASSIUM SERPL-SCNC: 3.7 MMOL/L (ref 3.5–5.1)
RBC # BLD AUTO: 4.89 M/UL (ref 4.1–5.7)
SODIUM SERPL-SCNC: 143 MMOL/L (ref 136–145)
WBC # BLD AUTO: 12.4 K/UL (ref 4.1–11.1)

## 2022-11-18 PROCEDURE — 80048 BASIC METABOLIC PNL TOTAL CA: CPT

## 2022-11-18 PROCEDURE — 97530 THERAPEUTIC ACTIVITIES: CPT

## 2022-11-18 PROCEDURE — 74011000250 HC RX REV CODE- 250: Performed by: HOSPITALIST

## 2022-11-18 PROCEDURE — 97165 OT EVAL LOW COMPLEX 30 MIN: CPT

## 2022-11-18 PROCEDURE — 36415 COLL VENOUS BLD VENIPUNCTURE: CPT

## 2022-11-18 PROCEDURE — 65270000029 HC RM PRIVATE

## 2022-11-18 PROCEDURE — 82962 GLUCOSE BLOOD TEST: CPT

## 2022-11-18 PROCEDURE — 85027 COMPLETE CBC AUTOMATED: CPT

## 2022-11-18 PROCEDURE — 74011250636 HC RX REV CODE- 250/636: Performed by: INTERNAL MEDICINE

## 2022-11-18 PROCEDURE — 74011000258 HC RX REV CODE- 258: Performed by: HOSPITALIST

## 2022-11-18 PROCEDURE — 74011250637 HC RX REV CODE- 250/637: Performed by: HOSPITALIST

## 2022-11-18 PROCEDURE — 97161 PT EVAL LOW COMPLEX 20 MIN: CPT

## 2022-11-18 PROCEDURE — 74011250636 HC RX REV CODE- 250/636: Performed by: HOSPITALIST

## 2022-11-18 RX ADMIN — ENOXAPARIN SODIUM 40 MG: 100 INJECTION SUBCUTANEOUS at 17:13

## 2022-11-18 RX ADMIN — SODIUM CHLORIDE, PRESERVATIVE FREE 10 ML: 5 INJECTION INTRAVENOUS at 17:13

## 2022-11-18 RX ADMIN — SODIUM CHLORIDE, PRESERVATIVE FREE 10 ML: 5 INJECTION INTRAVENOUS at 05:30

## 2022-11-18 RX ADMIN — PIPERACILLIN AND TAZOBACTAM 3.38 G: 3; .375 INJECTION, POWDER, FOR SOLUTION INTRAVENOUS at 17:10

## 2022-11-18 RX ADMIN — SODIUM CHLORIDE, PRESERVATIVE FREE 10 ML: 5 INJECTION INTRAVENOUS at 20:36

## 2022-11-18 RX ADMIN — LISINOPRIL 10 MG: 10 TABLET ORAL at 09:01

## 2022-11-18 RX ADMIN — MORPHINE SULFATE 2 MG: 2 INJECTION, SOLUTION INTRAMUSCULAR; INTRAVENOUS at 11:08

## 2022-11-18 RX ADMIN — PIPERACILLIN AND TAZOBACTAM 3.38 G: 3; .375 INJECTION, POWDER, FOR SOLUTION INTRAVENOUS at 00:17

## 2022-11-18 RX ADMIN — Medication 1500 MG: at 20:38

## 2022-11-18 RX ADMIN — ENOXAPARIN SODIUM 40 MG: 100 INJECTION SUBCUTANEOUS at 04:25

## 2022-11-18 RX ADMIN — Medication 1500 MG: at 05:35

## 2022-11-18 RX ADMIN — PIPERACILLIN AND TAZOBACTAM 3.38 G: 3; .375 INJECTION, POWDER, FOR SOLUTION INTRAVENOUS at 09:01

## 2022-11-18 NOTE — PROGRESS NOTES
Problem: Pain  Goal: *Control of Pain  Outcome: Progressing Towards Goal     Problem: Falls - Risk of  Goal: *Absence of Falls  Description: Document Arielle Fall Risk and appropriate interventions in the flowsheet.   Outcome: Progressing Towards Goal  Note: Fall Risk Interventions:  Mobility Interventions: Patient to call before getting OOB, Bed/chair exit alarm         Medication Interventions: Bed/chair exit alarm, Patient to call before getting OOB         History of Falls Interventions: Bed/chair exit alarm

## 2022-11-18 NOTE — PROGRESS NOTES
[facility] Hospitalist Progress Note  Thierry Campos MD  Date:2022       Room:[facility]  Patient Arun Matt     YOB: 1975     Age:47 y.o.    22 admission course  47M, very pleasant, h/o DMII and HTN on oral meds with left leg swelling sinbce 2 days   Symptoms started 2 days ago, and worsening slowly, associated with pain- sharp, non radiating and sesation of feeling warm. He was found to be in sepsis on arrival. He is a      no new complaints, tolerating medications well  Improving left lower extremity, yet presently unable to bear weight  Continue iv antibiotics    Subjective    Subjective:  Symptoms:  Stable. Review of Systems   All other systems reviewed and are negative. Objective         Vitals Last 24 Hours:  TEMPERATURE:  Temp  Av °F (36.7 °C)  Min: 97.2 °F (36.2 °C)  Max: 98.9 °F (37.2 °C)  RESPIRATIONS RANGE: Resp  Av.3  Min: 18  Max: 19  PULSE OXIMETRY RANGE: SpO2  Av.8 %  Min: 97 %  Max: 100 %  PULSE RANGE: Pulse  Av.3  Min: 85  Max: 95  BLOOD PRESSURE RANGE: Systolic (81VVI), YGB:193 , Min:128 , MXT:375   ; Diastolic (88YVL), IKT:74, Min:81, Max:94    I/O (24Hr): Intake/Output Summary (Last 24 hours) at 2022 1143  Last data filed at 2022 0639  Gross per 24 hour   Intake --   Output 1900 ml   Net -1900 ml       Objective:  General Appearance:  Comfortable. Vital signs: (most recent): Blood pressure (!) 146/94, pulse 90, temperature 98.1 °F (36.7 °C), resp. rate 19, height 6' 1\" (1.854 m), weight 154.2 kg (340 lb), SpO2 97 %. Physical Exam  Vitals and nursing note reviewed. Constitutional:       General: He is not in acute distress. Appearance: He is not ill-appearing, toxic-appearing or diaphoretic. HENT:      Head: Normocephalic and atraumatic. Cardiovascular:      Rate and Rhythm: Normal rate and regular rhythm. Heart sounds: Normal heart sounds.    Pulmonary:      Effort: Pulmonary effort is normal. Breath sounds: Normal breath sounds. Abdominal:      Palpations: Abdomen is soft. Tenderness: There is no abdominal tenderness. Musculoskeletal:      Cervical back: Normal range of motion and neck supple. Right lower leg: No tenderness. No edema. Left lower leg: No tenderness. No edema. Comments: Redness noted from the mid lower leg down with tenderness to palpation. Patient has tenderness to palpation tracking from the popliteal all the way up to the femoral vein. Tenderness also noted in the calf muscle and mildly anterior to the leg. There is no thigh tenderness anywhere aside from femoral vein. Skin:     General: Skin is warm and dry. Neurological:      Mental Status: He is alert and oriented to person, place, and time. Labs/Imaging/Diagnostics    Labs:  CBC:  Recent Labs     11/18/22 0752 11/17/22  1532 11/16/22  1330   WBC 12.4* 14.0* 21.6*   RBC 4.89 4.74 5.05   HGB 14.9 14.4 15.1   HCT 42.6 41.8 43.6   MCV 87.1 88.2 86.3   RDW 12.3 12.3 12.4    196 191       CHEMISTRIES:  Recent Labs     11/18/22  0752 11/17/22  1532 11/16/22  1330    140 135*   K 3.7 3.8 3.7   * 105 101   CO2 28 29 26   BUN 10 15 14   CA 8.7 8.2* 8.9     PT/INR:No results for input(s): INR, INREXT, INREXT in the last 72 hours. No lab exists for component: PROTIME  APTT:No results for input(s): APTT in the last 72 hours. LIVER PROFILE:No results for input(s): AST, ALT in the last 72 hours. No lab exists for component: BILIDIR, BILITOT, ALKPHOS  No results found for: ALT, AST, GGT, GGTP, AP, APIT, APX, CBIL, TBIL, TBILI    Imaging Last 24 Hours:  No results found. Assessment//Plan   Active Problems:    Cellulitis (11/16/2022)  CT Results  (Last 48 hours)                 11/16/22 1513  CT LOW EXT LT W CONT Final result    Impression:  Mild subcutaneous edema surrounding the left lower leg. No evidence of a focal   drainable fluid collection.        Narrative:  EXAM: CT LOW EXT LT W CONT       INDICATION: Left lower extremity pain and redness        COMPARISON: None       CONTRAST: 100 mL of Isovue-300. TECHNIQUE: Helical CT of the left lower extremity from the distal femur to the   toes during uneventful rapid bolus intravenous contrast administration. Coronal   and Sagittal reformats were generated. Images reviewed in soft tissue and bone   windows. CT dose reduction was achieved through the use of a standardized   protocol tailored for this examination and automatic exposure control for dose   modulation. FINDINGS: Bones: Normal bone mineralization. No fracture, dislocation, or   periosteal reaction. No evidence of osteomyelitis. Joint fluid: None. Articulations: No significant osteoarthritis. No evidence of inflammatory   arthritis. Tendons: No full-thickness tendon tear. Muscles: No intramuscular hematoma. No focal atrophy. Soft tissue mass: No mass. There is mild subcutaneous edema surrounding the left   lower leg. No evidence of a focal drainable fluid collection. No subcutaneous   emphysema. Assessment & Plan    11/16/22 admission course  47M, very pleasant, h/o DMII and HTN on oral meds with left leg swelling sinbce 2 days   Symptoms started 2 days ago, and worsening slowly, associated with pain- sharp, non radiating and sesation of feeling warm. He was found to be in sepsis on arrival. He is a     71/57/43 no new complaints, tolerating medications well  Improving left lower extremity, yet presently unable to bear weight  Continue iv antibiotics    MICROBIOLOGY    11/16/22 Blood  Negative so far    ASSESSMENT AND PLAN    1) Complex soft tissue infection of the left lower extremity.      Vancomycin and Zosyn for now    2) Diabetes mellitus     Sliding scale lispro ACHS    3) DVT prophylaxis with enoxaparin        Electronically signed by Bianka Mercado MD on 11/18/2022 at 1:54 PM

## 2022-11-18 NOTE — PROGRESS NOTES
Problem: Pain  Goal: *Control of Pain  Outcome: Progressing Towards Goal  Goal: *PALLIATIVE CARE:  Alleviation of Pain  Outcome: Progressing Towards Goal     Problem: Falls - Risk of  Goal: *Absence of Falls  Description: Document Arielle Fall Risk and appropriate interventions in the flowsheet.   Outcome: Progressing Towards Goal  Note: Fall Risk Interventions:            Medication Interventions: Teach patient to arise slowly

## 2022-11-18 NOTE — PROGRESS NOTES
OCCUPATIONAL THERAPY EVALUATION  Patient: Tori Andersen (28 y.o. male)  Date: 11/18/2022  Primary Diagnosis: Cellulitis [L03.90]       Precautions: fall risk       ASSESSMENT  Pt is a 56 y.o. male presenting to [facility] with c/o L LE pain, redness and swelling to L LE over the past couple of days, admitted 11/16/22 and currently being treated for complex soft tissue infection of the L LE/cellulitis, DM. Pt received semi-supine in bed upon arrival, AXO x4, and agreeable to OT/PT evaluations. Based on current observations, pt presents with deficits in generalized strength, static/dynamic standing balance (see PT note for gait details), functional activity tolerance, and c/o 10/10 L LE pain with mobility currently impacting overall performance of ADLs and functional transfers/mobility (see below for objective details and assist levels). Overall, pt tolerates session fair with additional time for supine>sit transfer s/t pain, however able to don socks IND with LE propped up on bed, IND donning slip on shoes and simulated IND grooming. Pt currently limited by significant pain reporting 0/10 and rest and 10/10 \"sharp, stabbing pain\" to L LE with mobility completing sit><stand transfers CGA in prep for toileting, yet unable to tolerate weight through L LE for ambulation (see PT note for gait details). Pt would benefit from continued skilled OT services to address current impairments and improve IND and safety with self cares and functional transfers/mobility, anticipate improvement in functional status once pain is controlled. Current OT d/c recommendation Home with Home Health Therapy once medically appropriate. Other factors to consider for discharge: family/social support, DME, time since onset, severity of deficits, functional baseline     Patient will benefit from skilled therapy intervention to address the above noted impairments.        PLAN :  Recommendations and Planned Interventions: self care training, functional mobility training, therapeutic exercise, balance training, therapeutic activities, endurance activities, neuromuscular re-education, patient education, and home safety training    Recommend with staff: Out of bed to chair for meals, Frequent repositioning to prevent skin breakdown, and LE elevation for management of edema    Recommend next session: Toileting    Frequency/Duration: Patient will be followed by occupational therapy:  3-5x/week to address goals. Recommendation for discharge: (in order for the patient to meet his/her long term goals)  Home with [address] [place] [address]    This discharge recommendation:  Has been made in collaboration with the attending provider and/or case management    IF patient discharges home will need the following DME: TBD       SUBJECTIVE:   Patient stated My left leg hurts when I put any weight on it.     OBJECTIVE DATA SUMMARY:   HISTORY:   Past Medical History:   Diagnosis Date    Diabetes ([address]     Hypertension      No past surgical history on file. Per pt report:   Home Situation  Home Environment: Other (comment) (RV)  # Steps to Enter: 2  Wheelchair Ramp: Yes  One/Two Story Residence: One story  Living Alone: No  Support Systems: Other Family Member(s)  Patient Expects to be Discharged to[de-identified] Home  Current DME Used/Available at Home: None      EXAMINATION OF PERFORMANCE DEFICITS:  Cognitive/Behavioral Status:  Neurologic State: Alert  Orientation Level: Oriented X4  Cognition: Follows commands; Appropriate decision making             Skin: redness/edema noted to distal LLE, elevated on pillow at end of session      Hearing: Auditory  Auditory Impairment: None    Vision/Perceptual:                                Corrective Lenses: Glasses    Range of Motion:  AROM: Within functional limits                         Strength:  Strength:  Within functional limits                Coordination:  Coordination: Within functional limits  Fine Motor Skills-Upper: Left Intact; Right Intact    Gross Motor Skills-Upper: Left Intact; Right Intact      Balance:  Sitting: Intact; Without support  Standing: Impaired; Without support  Standing - Static: Fair;Constant support  Standing - Dynamic : Fair;Poor;Constant support    Functional Mobility and Transfers for ADLs:  Bed Mobility:  Rolling: Modified independent  Supine to Sit: Modified independent  Sit to Supine: Modified independent  Scooting: Modified independent    Transfers:  Sit to Stand: Contact guard assistance  Stand to Sit: Contact guard assistance      ADL Intervention and task modifications:       Grooming  Grooming Assistance: Independent (simulated)  Position Performed: Long sitting on bed              Upper Body [address]: Independent    Lower Body Dressing Assistance  Socks: Modified independent  Slip on Shoes Without Back: Modified independent  Leg Crossed Method Used: Yes  Position Performed: Long sitting on bed;Seated edge of bed              Therapeutic Exercise:  Pt would benefit from UE HEP to improve overall UE AROM/strength and can be further educated in next treatment session. Functional Measure:    [facility] AM-PACTM \"6 Clicks\"                                                       Daily Activity Inpatient Short Form  How much help from another person does the patient currently need. .. Total; A Lot A Little None   1. Putting on and taking off regular lower body clothing? []  1 []  2 [x]  3 []  4   2. Bathing (including washing, rinsing, drying)? []  1 []  2 []  3 [x]  4   3. Toileting, which includes using toilet, bedpan or urinal? [] 1 []  2 [x]  3 []  4   4. Putting on and taking off regular upper body clothing? []  1 []  2 []  3 [x]  4   5. Taking care of personal grooming such as brushing teeth? []  1 []  2 []  3 [x]  4   6. Eating meals? []  1 []  2 []  3 [x]  4   © 2007, Trustees of [facility], under license to [employer].  All rights reserved     Score:      Interpretation of Tool:  Represents clinically-significant functional categories (i.e. Activities of daily living). Percentage of Impairment CH    0%   CI    1-19% CJ    20-39% CK    40-59% CL    60-79% CM    80-99% CN     100%   [facility]  Score 6-24 24 23 20-22 15-19 10-14 7-9 6     Occupational Therapy Evaluation Charge Determination   History Examination Decision-Making   LOW Complexity : Brief history review  LOW Complexity : 1-3 performance deficits relating to physical, cognitive , or psychosocial skils that result in activity limitations and / or participation restrictions  MEDIUM Complexity : Patient may present with comorbidities that affect occupational performnce. Miniml to moderate modification of tasks or assistance (eg, physical or verbal ) with assesment(s) is necessary to enable patient to complete evaluation       Based on the above components, the patient evaluation is determined to be of the following complexity level: LOW   Pain Ratin/10 at rest, 10/10 L LE \"sharp/stabbing\" pain with mobility (RN made aware)    Activity Tolerance:   Fair    After treatment patient left in no apparent distress:    Supine in bed, Heels elevated for pressure relief, and Call bell within reach, bed locked and in lowest position    COMMUNICATION/EDUCATION:   The patients plan of care was discussed with: Physical therapist and Registered nurse. Patient/family have participated as able in goal setting and plan of care. and Patient/family agree to work toward stated goals and plan of care. This patients plan of care is appropriate for delegation to [facility]. OT/PT sessions occurred together for increased patient and clinician safety as pt with decreased activity tolerance at this time.      Thank you for this referral.  Anant Six  Time Calculation: 23 mins   Problem: Self Care Deficits Care Plan (Adult)  Goal: *Acute Goals and Plan of Care (Insert Text)  Description: FUNCTIONAL STATUS PRIOR TO ADMISSION: Patient was independent and active without use of DME. Patient was independent for basic and instrumental ADLs. Pt states he is a  for a living and resides in [place].  Currently lives with family members in an [address] [place] [address] Dr: The patient lived with family but did not require assist.    Occupational Therapy Goals  Initiated 11/18/2022    Pt stated goal \"to be in less pain while moving\"  Pt will be IND sup <> sit in prep for EOB ADLs  Pt will be IND grooming standing sink side   Pt will be IND UB dressing sitting EOB/long sit   Pt will be IND LE dressing sitting EOB/long sit  Pt will be IND sit <>  prep for toileting  Pt will be IND toileting/toilet transfer/cloth mgmt  Pt will be IND following UE HEP in prep for self care tasks      Outcome: Not Met

## 2022-11-18 NOTE — PROGRESS NOTES
PHYSICAL THERAPY EVALUATION  Patient: Ernestina White (78 y.o. male)  Date: 11/18/2022  Primary Diagnosis: Cellulitis [L03.90]       Precautions: falls      ASSESSMENT  Pt is a 56 y.o. male admitted on 11/16/2022 for left LE redness x 2 days; pt currently being treated for left LE cellulitis, sepsis, HTN, DMII. Left LE ultrasound negative for DVT. Pt semi-supine in bed upon PT/OT arrival, agreeable to evaluation. Pt A&O x 4. Based on the objective data described, the patient presents with generalized weakness, impaired functional mobility, impaired amb, impaired balance, and decreased activity tolerance due to pain when left LE dependent position. (See below for objective details and assist levels). Overall pt tolerated session fair today with c/o 0/10 pain at rest, increasing to \"20\"/10 with mobility in left LE. Pt only able to amb 2 steps away from bed due to pain, requiring 4 attempts to reach full standing due to increased pain with EOB sitting position. Pain sig limited mobility this session, pt deferred use of RW at this time but may benefit from use next session. Pt also reports declining pain medication today, which may also be reason for elevated pain with mobility this session. Pt will benefit from continued skilled PT to address above deficits and return to PLOF. Current PT DC recommendation To Be Determined once medically appropriate. Current Level of Function Impacting Discharge (mobility/balance): mod I to CGA    Other factors to consider for discharge: acute medical state      PLAN :  Recommendations and Planned Interventions: bed mobility training, transfer training, gait training, therapeutic exercises, patient and family training/education, and therapeutic activities      Recommend for staff: Out of bed to chair for meals and LE elevation for management of edema    Frequency/Duration: Patient will be followed by physical therapy:  3-5x/week to address goals.     Recommendation for discharge: (in order for the patient to meet his/her long term goals)  To Be Determined    This discharge recommendation:  Has been made in collaboration with the attending provider and/or case management    IF patient discharges home will need the following DME: to be determined (TBD)         SUBJECTIVE:   Patient stated my leg actual feels better when I weight bear on it.     OBJECTIVE DATA SUMMARY:   HISTORY:    Past Medical History:   Diagnosis Date    Diabetes ([address]     Hypertension    No past surgical history on file. Home Situation  Home Environment: Other (comment) (RV)  # Steps to Enter: 2  Wheelchair Ramp: Yes  One/Two Story Residence: One story  Living Alone: No  Support Systems: Other Family Member(s)  Patient Expects to be Discharged to[de-identified] Home  Current DME Used/Available at Home: None    EXAMINATION/PRESENTATION/DECISION MAKING:   Critical Behavior:  Neurologic State: Alert  Orientation Level: Oriented X4  Cognition: Follows commands, Appropriate decision making     Hearing: Auditory  Auditory Impairment: None  Skin:  sig redness noted left LE posterior> anterior   Edema: left LE, +2 pitting noted   Range Of Motion:  AROM: Within functional limits                       Strength:    Strength: Within functional limits                 Coordination:  Coordination: Within functional limits  Vision:   Corrective Lenses: Glasses  Functional Mobility:  Bed Mobility:  Rolling: Modified independent  Supine to Sit: Modified independent  Sit to Supine: Modified independent  Scooting: Modified independent  Transfers:  Sit to Stand: Contact guard assistance  Stand to Sit: Contact guard assistance                       Balance:   Sitting: Intact; Without support  Standing: Impaired; Without support  Standing - Static: Fair;Constant support  Standing - Dynamic : Fair;Poor;Constant support  Ambulation/Gait Training:              Gait Description (WDL):  (only able to amb 2 steps away from bed due to pain)    Therapeutic Exercises: Pt would benefit from LE HEP to improve overall LE AROM/strength and can be further educated in next treatment session. Functional Measure:  [employer] 6 Clicks         Basic Mobility Inpatient Short Form  How much difficulty does the patient currently have. .. Unable A Lot A Little None   1. Turning over in bed (including adjusting bedclothes, sheets and blankets)? [] 1   [] 2   [] 3   [x] 4   2. Sitting down on and standing up from a chair with arms ( e.g., wheelchair, bedside commode, etc.)   [] 1   [] 2   [] 3   [x] 4   3. Moving from lying on back to sitting on the side of the bed? [] 1   [] 2   [] 3   [x] 4          How much help from another person does the patient currently need. .. Total A Lot A Little None   4. Moving to and from a bed to a chair (including a wheelchair)? [] 1   [] 2   [x] 3   [] 4   5. Need to walk in hospital room? [] 1   [] 2   [x] 3   [] 4   6. Climbing 3-5 steps with a railing? [] 1   [] 2   [x] 3   [] 4   © 2007, Trustees of [employer], under license to [employer]. All rights reserved     Score:  Initial: 21/24 Most Recent: X (Date: 11/18/22 )   Interpretation of Tool:  Represents activities that are increasingly more difficult (i.e. Bed mobility, Transfers, Gait).   Score 24 23 22-20 19-15 14-10 9-7 6   Modifier CH CI CJ CK CL CM CN         Physical Therapy Evaluation Charge Determination   History Examination Presentation Decision-Making   HIGH Complexity :3+ comorbidities / personal factors will impact the outcome/ POC  HIGH Complexity : 4+ Standardized tests and measures addressing body structure, function, activity limitation and / or participation in recreation  MEDIUM Complexity : Evolving with changing characteristics  Other outcome measures ampac 6  mod      Based on the above components, the patient evaluation is determined to be of the following complexity level: MEDIUM    Pain Rating:  \"20\"/10 left LE with dependent hang    Activity Tolerance:   Fair, requires rest breaks, and pain sig limiting mobility    After treatment patient left in no apparent distress:   Bed locked and in lowest position Supine in bed, Heels elevated for pressure relief, Call bell within reach, and Side rails x 3 and nsg updated. GOALS:    Problem: Mobility Impaired (Adult and Pediatric)  Goal: *Acute Goals and Plan of Care (Insert Text)  Description: FUNCTIONAL STATUS PRIOR TO ADMISSION: Patient was independent and active without use of DME. Patient was independent for basic and instrumental ADLs. HOME SUPPORT PRIOR TO ADMISSION: The patient lived with family but did not require assist.    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 Pt will perform bed mobility with I in 7 days. Pt will perform transfers with I in 7 days. Pt will amb  feet with LRAD safely with I in 7 days. Pt will demonstrate improvement in standing dynamic balance from CGA to I in 7 days. Outcome: Not Met       COMMUNICATION/EDUCATION:   The patients plan of care was discussed with: Occupational therapist, Registered nurse, and Case management. Fall prevention education was provided and the patient/caregiver indicated understanding., Patient/family have participated as able in goal setting and plan of care. , and Patient/family agree to work toward stated goals and plan of care. PT/OT sessions occurred together for increased safety of pt and clinician.        Thank you for this referral.  Sahara Palacios, PT, DPT   Time Calculation: 22 mins

## 2022-11-19 LAB
ANION GAP SERPL CALC-SCNC: 5 MMOL/L (ref 5–15)
BUN SERPL-MCNC: 10 MG/DL (ref 6–20)
BUN/CREAT SERPL: 14 (ref 12–20)
CA-I BLD-MCNC: 9 MG/DL (ref 8.5–10.1)
CHLORIDE SERPL-SCNC: 109 MMOL/L (ref 97–108)
CO2 SERPL-SCNC: 28 MMOL/L (ref 21–32)
CREAT SERPL-MCNC: 0.69 MG/DL (ref 0.7–1.3)
ERYTHROCYTE [DISTWIDTH] IN BLOOD BY AUTOMATED COUNT: 12.2 % (ref 11.5–14.5)
GLUCOSE BLD STRIP.AUTO-MCNC: 110 MG/DL (ref 65–100)
GLUCOSE BLD STRIP.AUTO-MCNC: 116 MG/DL (ref 65–100)
GLUCOSE BLD STRIP.AUTO-MCNC: 121 MG/DL (ref 65–100)
GLUCOSE BLD STRIP.AUTO-MCNC: 93 MG/DL (ref 65–100)
GLUCOSE SERPL-MCNC: 98 MG/DL (ref 65–100)
HCT VFR BLD AUTO: 42 % (ref 36.6–50.3)
HGB BLD-MCNC: 14.5 G/DL (ref 12.1–17)
MCH RBC QN AUTO: 30.1 PG (ref 26–34)
MCHC RBC AUTO-ENTMCNC: 34.5 G/DL (ref 30–36.5)
MCV RBC AUTO: 87.1 FL (ref 80–99)
NRBC # BLD: 0 K/UL (ref 0–0.01)
NRBC BLD-RTO: 0 PER 100 WBC
PERFORMED BY, TECHID: ABNORMAL
PERFORMED BY, TECHID: NORMAL
PLATELET # BLD AUTO: 235 K/UL (ref 150–400)
PMV BLD AUTO: 10.6 FL (ref 8.9–12.9)
POTASSIUM SERPL-SCNC: 3.4 MMOL/L (ref 3.5–5.1)
RBC # BLD AUTO: 4.82 M/UL (ref 4.1–5.7)
SODIUM SERPL-SCNC: 142 MMOL/L (ref 136–145)
VANCOMYCIN SERPL-MCNC: 10.2 UG/ML
WBC # BLD AUTO: 12.7 K/UL (ref 4.1–11.1)

## 2022-11-19 PROCEDURE — 74011250637 HC RX REV CODE- 250/637: Performed by: HOSPITALIST

## 2022-11-19 PROCEDURE — 80048 BASIC METABOLIC PNL TOTAL CA: CPT

## 2022-11-19 PROCEDURE — 74011000258 HC RX REV CODE- 258: Performed by: HOSPITALIST

## 2022-11-19 PROCEDURE — 36415 COLL VENOUS BLD VENIPUNCTURE: CPT

## 2022-11-19 PROCEDURE — 74011250637 HC RX REV CODE- 250/637: Performed by: INTERNAL MEDICINE

## 2022-11-19 PROCEDURE — 85027 COMPLETE CBC AUTOMATED: CPT

## 2022-11-19 PROCEDURE — 65270000029 HC RM PRIVATE

## 2022-11-19 PROCEDURE — 82962 GLUCOSE BLOOD TEST: CPT

## 2022-11-19 PROCEDURE — 74011250636 HC RX REV CODE- 250/636: Performed by: INTERNAL MEDICINE

## 2022-11-19 PROCEDURE — 74011250636 HC RX REV CODE- 250/636: Performed by: HOSPITALIST

## 2022-11-19 PROCEDURE — 80202 ASSAY OF VANCOMYCIN: CPT

## 2022-11-19 RX ORDER — POTASSIUM CHLORIDE 1.5 G/1.77G
40 POWDER, FOR SOLUTION ORAL
Status: COMPLETED | OUTPATIENT
Start: 2022-11-19 | End: 2022-11-19

## 2022-11-19 RX ADMIN — ENOXAPARIN SODIUM 40 MG: 100 INJECTION SUBCUTANEOUS at 14:17

## 2022-11-19 RX ADMIN — LISINOPRIL 10 MG: 10 TABLET ORAL at 09:20

## 2022-11-19 RX ADMIN — PIPERACILLIN AND TAZOBACTAM 3.38 G: 3; .375 INJECTION, POWDER, FOR SOLUTION INTRAVENOUS at 17:55

## 2022-11-19 RX ADMIN — POTASSIUM CHLORIDE 40 MEQ: 1.5 POWDER, FOR SOLUTION ORAL at 14:17

## 2022-11-19 RX ADMIN — PIPERACILLIN AND TAZOBACTAM 3.38 G: 3; .375 INJECTION, POWDER, FOR SOLUTION INTRAVENOUS at 09:20

## 2022-11-19 RX ADMIN — PIPERACILLIN AND TAZOBACTAM 3.38 G: 3; .375 INJECTION, POWDER, FOR SOLUTION INTRAVENOUS at 01:04

## 2022-11-19 RX ADMIN — Medication 1500 MG: at 11:10

## 2022-11-19 RX ADMIN — ENOXAPARIN SODIUM 40 MG: 100 INJECTION SUBCUTANEOUS at 05:31

## 2022-11-19 RX ADMIN — Medication 1500 MG: at 23:24

## 2022-11-19 NOTE — PROGRESS NOTES
Vancomycin Dosing Consult  Medina Valle is a 56 y.o. male with LLE SSTI, sepsis. Pharmacy was consulted by Dr. Jen Cope to dose and monitor vancomycin. Today is day 4.     Antibiotic regimen: Vancomycin + Zosyn    Temp (24hrs), Av.1 °F (36.7 °C), Min:97.8 °F (36.6 °C), Max:98.4 °F (36.9 °C)    Recent Labs     22  0412 22  0752 22  1532   WBC 12.7* 12.4* 14.0*   CREA 0.69* 0.71 0.88  0.87   BUN 10 10 15     Est CrCl: >100 mL/min  Concomitant nephrotoxic drugs: None    Cultures:    Blood: NGTD, preliminary    MRSA Swab: Not ordered, patient already received first dose of vancomycin    Target range: AUC/ESTRADA 400-600    Recent level history:  Date/Time Dose & Interval Measured Level (mcg/mL) Associated AUC/ESTRADA    @ 1532 1500 mg q12h 5.1 490    @ 0412 1500 mg q12h 10.2 428        Assessment/Plan:   Afebrile, leukocytosis, procal moderately elevated   SCr trending down with improved clearance  Extrapolated AUC of 428 is within therapeutic range, continue current regimen  Will check a level prior to dose  @ 0600  Antimicrobial stop date TBD, anticipated discharge on PO bactrim, doxycycline

## 2022-11-19 NOTE — PROGRESS NOTES
[facility] Hospitalist Progress Note  Thierry Villalobos MD  Date:2022       Room:[facility]  Patient Jamey Koo     YOB: 1975     Age:47 y.o.    22 admission course  47M, very pleasant, h/o DMII and HTN on oral meds with left leg swelling sinbce 2 days   Symptoms started 2 days ago, and worsening slowly, associated with pain- sharp, non radiating and sesation of feeling warm. He was found to be in sepsis on arrival. He is a      no new complaints, tolerating medications well  Improving left lower extremity, yet presently unable to bear weight  Continue iv antibiotics    Subjective    Subjective:  Symptoms:  Stable. Review of Systems   All other systems reviewed and are negative. Objective         Vitals Last 24 Hours:  TEMPERATURE:  Temp  Av.1 °F (36.7 °C)  Min: 97.8 °F (36.6 °C)  Max: 98.4 °F (36.9 °C)  RESPIRATIONS RANGE: Resp  Av  Min: 16  Max: 20  PULSE OXIMETRY RANGE: SpO2  Av %  Min: 94 %  Max: 98 %  PULSE RANGE: Pulse  Av.6  Min: 80  Max: 86  BLOOD PRESSURE RANGE: Systolic (04VSI), MOHIT:250 , Min:130 , OAS:352   ; Diastolic (92KCU), SYR:88, Min:82, Max:95    I/O (24Hr): No intake or output data in the 24 hours ending 22 0957    Objective:  General Appearance:  Comfortable. Vital signs: (most recent): Blood pressure (!) 144/95, pulse 80, temperature 98 °F (36.7 °C), resp. rate 16, height 6' 1\" (1.854 m), weight 154.2 kg (340 lb), SpO2 94 %. Physical Exam  Vitals and nursing note reviewed. Constitutional:       General: He is not in acute distress. Appearance: He is not ill-appearing, toxic-appearing or diaphoretic. HENT:      Head: Normocephalic and atraumatic. Cardiovascular:      Rate and Rhythm: Normal rate and regular rhythm. Heart sounds: Normal heart sounds. Pulmonary:      Effort: Pulmonary effort is normal.      Breath sounds: Normal breath sounds. Abdominal:      Palpations: Abdomen is soft.       Tenderness: There is no abdominal tenderness. Musculoskeletal:      Cervical back: Normal range of motion and neck supple. Right lower leg: No tenderness. No edema. Left lower leg: No tenderness. No edema. Comments: Redness noted from the mid lower leg down with tenderness to palpation. Patient has tenderness to palpation tracking from the popliteal all the way up to the femoral vein. Tenderness also noted in the calf muscle and mildly anterior to the leg. There is no thigh tenderness anywhere aside from femoral vein. Skin:     General: Skin is warm and dry. Neurological:      Mental Status: He is alert and oriented to person, place, and time. Labs/Imaging/Diagnostics    Labs:  CBC:  Recent Labs     11/19/22 0412 11/18/22 0752 11/17/22  1532   WBC 12.7* 12.4* 14.0*   RBC 4.82 4.89 4.74   HGB 14.5 14.9 14.4   HCT 42.0 42.6 41.8   MCV 87.1 87.1 88.2   RDW 12.2 12.3 12.3    216 196       CHEMISTRIES:  Recent Labs     11/19/22 0412 11/18/22 0752 11/17/22  1532    143 140   K 3.4* 3.7 3.8   * 110* 105   CO2 28 28 29   BUN 10 10 15   CA 9.0 8.7 8.2*     PT/INR:No results for input(s): INR, INREXT, INREXT in the last 72 hours. No lab exists for component: PROTIME  APTT:No results for input(s): APTT in the last 72 hours. LIVER PROFILE:No results for input(s): AST, ALT in the last 72 hours. No lab exists for component: BILIDIR, BILITOT, ALKPHOS  No results found for: ALT, AST, GGT, GGTP, AP, APIT, APX, CBIL, TBIL, TBILI    Imaging Last 24 Hours:  No results found. Assessment//Plan   Active Problems:    Cellulitis (11/16/2022)  CT Results  (Last 48 hours)      None            Assessment & Plan    11/16/22 admission course  47M, very pleasant, h/o DMII and HTN on oral meds with left leg swelling sinbce 2 days   Symptoms started 2 days ago, and worsening slowly, associated with pain- sharp, non radiating and sesation of feeling warm.  He was found to be in sepsis on arrival. He is a     25/40/39 no new complaints, tolerating medications well  Improving left lower extremity, yet presently unable to bear weight  Continue iv antibiotics    MICROBIOLOGY    11/16/22 Blood  Negative    ASSESSMENT AND PLAN    1) Complex soft tissue infection of the left lower extremity.      Vancomycin and Zosyn for now   Once he is able to bear weight anticipate discharge on oral antibiotics    Bactrim DS one tab po bid through 11/28/22    Doxycycline 100mg po bid through 11/28/22    2) Diabetes mellitus     Sliding scale lispro ACHS    3) DVT prophylaxis with enoxaparin        Electronically signed by Latesha Tobias MD on 11/19/2022 at 1:54 PM

## 2022-11-20 LAB
ANION GAP SERPL CALC-SCNC: 6 MMOL/L (ref 5–15)
BUN SERPL-MCNC: 11 MG/DL (ref 6–20)
BUN/CREAT SERPL: 15 (ref 12–20)
CA-I BLD-MCNC: 8.9 MG/DL (ref 8.5–10.1)
CHLORIDE SERPL-SCNC: 109 MMOL/L (ref 97–108)
CO2 SERPL-SCNC: 27 MMOL/L (ref 21–32)
CREAT SERPL-MCNC: 0.73 MG/DL (ref 0.7–1.3)
ERYTHROCYTE [DISTWIDTH] IN BLOOD BY AUTOMATED COUNT: 12 % (ref 11.5–14.5)
GLUCOSE BLD STRIP.AUTO-MCNC: 102 MG/DL (ref 65–100)
GLUCOSE BLD STRIP.AUTO-MCNC: 102 MG/DL (ref 65–100)
GLUCOSE BLD STRIP.AUTO-MCNC: 110 MG/DL (ref 65–100)
GLUCOSE BLD STRIP.AUTO-MCNC: 121 MG/DL (ref 65–100)
GLUCOSE SERPL-MCNC: 103 MG/DL (ref 65–100)
HCT VFR BLD AUTO: 42.5 % (ref 36.6–50.3)
HGB BLD-MCNC: 14.9 G/DL (ref 12.1–17)
MCH RBC QN AUTO: 30.3 PG (ref 26–34)
MCHC RBC AUTO-ENTMCNC: 35.1 G/DL (ref 30–36.5)
MCV RBC AUTO: 86.4 FL (ref 80–99)
NRBC # BLD: 0 K/UL (ref 0–0.01)
NRBC BLD-RTO: 0 PER 100 WBC
PERFORMED BY, TECHID: ABNORMAL
PLATELET # BLD AUTO: 268 K/UL (ref 150–400)
PMV BLD AUTO: 11.1 FL (ref 8.9–12.9)
POTASSIUM SERPL-SCNC: 3.9 MMOL/L (ref 3.5–5.1)
PROCALCITONIN SERPL-MCNC: 0.23 NG/ML
RBC # BLD AUTO: 4.92 M/UL (ref 4.1–5.7)
SODIUM SERPL-SCNC: 142 MMOL/L (ref 136–145)
WBC # BLD AUTO: 13.5 K/UL (ref 4.1–11.1)

## 2022-11-20 PROCEDURE — 65270000029 HC RM PRIVATE

## 2022-11-20 PROCEDURE — 36415 COLL VENOUS BLD VENIPUNCTURE: CPT

## 2022-11-20 PROCEDURE — 74011250637 HC RX REV CODE- 250/637: Performed by: HOSPITALIST

## 2022-11-20 PROCEDURE — 80048 BASIC METABOLIC PNL TOTAL CA: CPT

## 2022-11-20 PROCEDURE — 84145 PROCALCITONIN (PCT): CPT

## 2022-11-20 PROCEDURE — 82962 GLUCOSE BLOOD TEST: CPT

## 2022-11-20 PROCEDURE — 74011000258 HC RX REV CODE- 258: Performed by: HOSPITALIST

## 2022-11-20 PROCEDURE — 74011250636 HC RX REV CODE- 250/636: Performed by: HOSPITALIST

## 2022-11-20 PROCEDURE — 74011250636 HC RX REV CODE- 250/636: Performed by: INTERNAL MEDICINE

## 2022-11-20 PROCEDURE — 85027 COMPLETE CBC AUTOMATED: CPT

## 2022-11-20 RX ADMIN — LISINOPRIL 10 MG: 10 TABLET ORAL at 08:48

## 2022-11-20 RX ADMIN — PIPERACILLIN AND TAZOBACTAM 3.38 G: 3; .375 INJECTION, POWDER, FOR SOLUTION INTRAVENOUS at 15:58

## 2022-11-20 RX ADMIN — PIPERACILLIN AND TAZOBACTAM 3.38 G: 3; .375 INJECTION, POWDER, FOR SOLUTION INTRAVENOUS at 08:47

## 2022-11-20 RX ADMIN — PIPERACILLIN AND TAZOBACTAM 3.38 G: 3; .375 INJECTION, POWDER, FOR SOLUTION INTRAVENOUS at 03:01

## 2022-11-20 RX ADMIN — ENOXAPARIN SODIUM 40 MG: 100 INJECTION SUBCUTANEOUS at 15:59

## 2022-11-20 RX ADMIN — Medication 1500 MG: at 23:00

## 2022-11-20 RX ADMIN — ENOXAPARIN SODIUM 40 MG: 100 INJECTION SUBCUTANEOUS at 03:01

## 2022-11-20 RX ADMIN — Medication 1500 MG: at 11:24

## 2022-11-20 NOTE — PROGRESS NOTES
Hospitalist Progress Note         Sona Jain MD          Daily Progress Note: 11/20/2022      Subjective: The patient is seen for follow  up. 11/16/22 admission course  47M, very pleasant, h/o DMII and HTN on oral meds with left leg swelling sinbce 2 days   Symptoms started 2 days ago, and worsening slowly, associated with pain- sharp, non radiating and sesation of feeling warm. He was found to be in sepsis on arrival.  ---  Tolerating IV antibiotics.   Reports improvement in left leg swelling and redness    Problem List:  Problem List as of 11/20/2022 Never Reviewed            Codes Class Noted - Resolved    Cellulitis ICD-10-CM: L03.90  ICD-9-CM: 682.9  11/16/2022 - Present           Medications reviewed  Current Facility-Administered Medications   Medication Dose Route Frequency    [START ON 11/21/2022] Vancomycin - Trough to be drawn prior to dose 11/21 @ 1100   Other ONCE    glucose chewable tablet 16 g  4 Tablet Oral PRN    glucagon (GLUCAGEN) injection 1 mg  1 mg IntraMUSCular PRN    dextrose 10% infusion 0-250 mL  0-250 mL IntraVENous PRN    insulin lispro (HUMALOG) injection   SubCUTAneous AC&HS    enoxaparin (LOVENOX) injection 40 mg  40 mg SubCUTAneous Q12H    sodium chloride (NS) flush 5-40 mL  5-40 mL IntraVENous PRN    acetaminophen (TYLENOL) tablet 650 mg  650 mg Oral Q6H PRN    Or    acetaminophen (TYLENOL) suppository 650 mg  650 mg Rectal Q6H PRN    polyethylene glycol (MIRALAX) packet 17 g  17 g Oral DAILY PRN    ondansetron (ZOFRAN ODT) tablet 4 mg  4 mg Oral Q8H PRN    Or    ondansetron (ZOFRAN) injection 4 mg  4 mg IntraVENous Q6H PRN    piperacillin-tazobactam (ZOSYN) 3.375 g in 0.9% sodium chloride (MBP/ADV) 100 mL MBP  3.375 g IntraVENous Q8H    lisinopriL (PRINIVIL, ZESTRIL) tablet 10 mg  10 mg Oral DAILY    hydrALAZINE (APRESOLINE) 20 mg/mL injection 10 mg  10 mg IntraVENous Q6H PRN    VANCOMYCIN INFORMATION NOTE   Other Rx Dosing/Monitoring vancomycin (VANCOCIN) 1500 mg in  ml infusion  1,500 mg IntraVENous Q12H       Review of Systems:   A comprehensive review of systems was negative except for that written in the HPI. Objective:   Physical Exam:     Visit Vitals  BP (!) 142/88   Pulse 71   Temp 97.8 °F (36.6 °C)   Resp 16   Ht 6' 1\" (1.854 m)   Wt 154.2 kg (340 lb)   SpO2 95%   BMI 44.86 kg/m²      O2 Device: None (Room air)    Temp (24hrs), Av.9 °F (36.6 °C), Min:97.7 °F (36.5 °C), Max:98.1 °F (36.7 °C)    No intake/output data recorded. No intake/output data recorded. General:  Alert, cooperative, no distress, appears stated age. Lungs:   Clear to auscultation bilaterally. Chest wall:  No tenderness or deformity. Heart:  Regular rate and rhythm, S1, S2 normal, no murmur, click, rub or gallop. Abdomen:   Soft, non-tender. Bowel sounds normal. No masses,  No organomegaly. Extremities: Extremities normal, atraumatic, no cyanosis or edema. Pulses: 2+ and symmetric all extremities. Skin: Skin color, texture, turgor normal. No rashes or lesions   Neurologic: CNII-XII intact. No gross sensory or motor deficits     Data Review:       Recent Days:  Recent Labs     22  0716 222 22  0752   WBC 13.5* 12.7* 12.4*   HGB 14.9 14.5 14.9   HCT 42.5 42.0 42.6    235 216     Recent Labs     22  0716 22  0412 22  0752    142 143   K 3.9 3.4* 3.7   * 109* 110*   CO2 27 28 28   * 98 102*   BUN 11 10 10   CREA 0.73 0.69* 0.71   CA 8.9 9.0 8.7     No results for input(s): PH, PCO2, PO2, HCO3, FIO2 in the last 72 hours.     24 Hour Results:  Recent Results (from the past 24 hour(s))   GLUCOSE, POC    Collection Time: 22 11:24 AM   Result Value Ref Range    Glucose (POC) 121 (H) 65 - 100 mg/dL    Performed by Alo Serriki    GLUCOSE, POC    Collection Time: 22  4:00 PM   Result Value Ref Range    Glucose (POC) 110 (H) 65 - 100 mg/dL    Performed by Alo Serriki GLUCOSE, POC    Collection Time: 11/19/22  8:23 PM   Result Value Ref Range    Glucose (POC) 93 65 - 100 mg/dL    Performed by Floyd Denney    CBC W/O DIFF    Collection Time: 11/20/22  7:16 AM   Result Value Ref Range    WBC 13.5 (H) 4.1 - 11.1 K/uL    RBC 4.92 4.10 - 5.70 M/uL    HGB 14.9 12.1 - 17.0 g/dL    HCT 42.5 36.6 - 50.3 %    MCV 86.4 80.0 - 99.0 FL    MCH 30.3 26.0 - 34.0 PG    MCHC 35.1 30.0 - 36.5 g/dL    RDW 12.0 11.5 - 14.5 %    PLATELET 641 410 - 227 K/uL    MPV 11.1 8.9 - 12.9 FL    NRBC 0.0 0.0  WBC    ABSOLUTE NRBC 0.00 0.00 - 3.73 K/uL   METABOLIC PANEL, BASIC    Collection Time: 11/20/22  7:16 AM   Result Value Ref Range    Sodium 142 136 - 145 mmol/L    Potassium 3.9 3.5 - 5.1 mmol/L    Chloride 109 (H) 97 - 108 mmol/L    CO2 27 21 - 32 mmol/L    Anion gap 6 5 - 15 mmol/L    Glucose 103 (H) 65 - 100 mg/dL    BUN 11 6 - 20 mg/dL    Creatinine 0.73 0.70 - 1.30 mg/dL    BUN/Creatinine ratio 15 12 - 20      eGFR >60 >60 ml/min/1.73m2    Calcium 8.9 8.5 - 10.1 mg/dL   PROCALCITONIN    Collection Time: 11/20/22  7:16 AM   Result Value Ref Range    Procalcitonin 0.23 (H) 0 ng/mL   GLUCOSE, POC    Collection Time: 11/20/22  7:18 AM   Result Value Ref Range    Glucose (POC) 102 (H) 65 - 100 mg/dL    Performed by Rodney Artis            Assessment/     Sepsis    -Secondary to left lower extremity cellulitis    -Continue empiric IV antibiotics    -Anticipate discharge to home tomorrow on Bactrim and doxycycline to complete additional 10 days    Type 2 diabetes    -Fairly stable    Plan:  Continue supportive care  Anticipate discharge to home tomorrow      Care Plan discussed with: Patient/Family    Total time spent with patient: 30 minutes.     Tawanna Wolff MD

## 2022-11-21 LAB
ANION GAP SERPL CALC-SCNC: 6 MMOL/L (ref 5–15)
BUN SERPL-MCNC: 11 MG/DL (ref 6–20)
BUN/CREAT SERPL: 13 (ref 12–20)
CA-I BLD-MCNC: 9.3 MG/DL (ref 8.5–10.1)
CHLORIDE SERPL-SCNC: 106 MMOL/L (ref 97–108)
CO2 SERPL-SCNC: 28 MMOL/L (ref 21–32)
CREAT SERPL-MCNC: 0.82 MG/DL (ref 0.7–1.3)
DATE LAST DOSE: 0
ERYTHROCYTE [DISTWIDTH] IN BLOOD BY AUTOMATED COUNT: 12 % (ref 11.5–14.5)
GLUCOSE BLD STRIP.AUTO-MCNC: 118 MG/DL (ref 65–100)
GLUCOSE BLD STRIP.AUTO-MCNC: 130 MG/DL (ref 65–100)
GLUCOSE BLD STRIP.AUTO-MCNC: 86 MG/DL (ref 65–100)
GLUCOSE BLD STRIP.AUTO-MCNC: 99 MG/DL (ref 65–100)
GLUCOSE SERPL-MCNC: 151 MG/DL (ref 65–100)
HCT VFR BLD AUTO: 45.1 % (ref 36.6–50.3)
HGB BLD-MCNC: 15.5 G/DL (ref 12.1–17)
MCH RBC QN AUTO: 29.6 PG (ref 26–34)
MCHC RBC AUTO-ENTMCNC: 34.4 G/DL (ref 30–36.5)
MCV RBC AUTO: 86.2 FL (ref 80–99)
NRBC # BLD: 0 K/UL (ref 0–0.01)
NRBC BLD-RTO: 0 PER 100 WBC
PERFORMED BY, TECHID: ABNORMAL
PERFORMED BY, TECHID: ABNORMAL
PERFORMED BY, TECHID: NORMAL
PERFORMED BY, TECHID: NORMAL
PLATELET # BLD AUTO: 293 K/UL (ref 150–400)
PMV BLD AUTO: 11.1 FL (ref 8.9–12.9)
POTASSIUM SERPL-SCNC: 4 MMOL/L (ref 3.5–5.1)
RBC # BLD AUTO: 5.23 M/UL (ref 4.1–5.7)
REPORTED DOSE,DOSE: 0 UNITS
SODIUM SERPL-SCNC: 140 MMOL/L (ref 136–145)
VANCOMYCIN SERPL-MCNC: 10.2 UG/ML
WBC # BLD AUTO: 11.3 K/UL (ref 4.1–11.1)

## 2022-11-21 PROCEDURE — 74011000258 HC RX REV CODE- 258: Performed by: HOSPITALIST

## 2022-11-21 PROCEDURE — 80048 BASIC METABOLIC PNL TOTAL CA: CPT

## 2022-11-21 PROCEDURE — 36415 COLL VENOUS BLD VENIPUNCTURE: CPT

## 2022-11-21 PROCEDURE — 85027 COMPLETE CBC AUTOMATED: CPT

## 2022-11-21 PROCEDURE — 82962 GLUCOSE BLOOD TEST: CPT

## 2022-11-21 PROCEDURE — 74011250637 HC RX REV CODE- 250/637: Performed by: HOSPITALIST

## 2022-11-21 PROCEDURE — 97530 THERAPEUTIC ACTIVITIES: CPT

## 2022-11-21 PROCEDURE — 74011250637 HC RX REV CODE- 250/637: Performed by: INTERNAL MEDICINE

## 2022-11-21 PROCEDURE — 74011250636 HC RX REV CODE- 250/636: Performed by: INTERNAL MEDICINE

## 2022-11-21 PROCEDURE — 65270000029 HC RM PRIVATE

## 2022-11-21 PROCEDURE — 80202 ASSAY OF VANCOMYCIN: CPT

## 2022-11-21 PROCEDURE — 74011250636 HC RX REV CODE- 250/636: Performed by: HOSPITALIST

## 2022-11-21 RX ORDER — VANCOMYCIN/0.9 % SOD CHLORIDE 1.5G/250ML
1500 PLASTIC BAG, INJECTION (ML) INTRAVENOUS EVERY 8 HOURS
Status: DISCONTINUED | OUTPATIENT
Start: 2022-11-21 | End: 2022-11-22

## 2022-11-21 RX ORDER — AMLODIPINE BESYLATE 5 MG/1
10 TABLET ORAL DAILY
Status: DISCONTINUED | OUTPATIENT
Start: 2022-11-21 | End: 2022-11-22 | Stop reason: HOSPADM

## 2022-11-21 RX ORDER — LISINOPRIL 10 MG/1
20 TABLET ORAL DAILY
Status: DISCONTINUED | OUTPATIENT
Start: 2022-11-22 | End: 2022-11-22 | Stop reason: HOSPADM

## 2022-11-21 RX ADMIN — PIPERACILLIN AND TAZOBACTAM 3.38 G: 3; .375 INJECTION, POWDER, FOR SOLUTION INTRAVENOUS at 16:21

## 2022-11-21 RX ADMIN — Medication 1500 MG: at 13:16

## 2022-11-21 RX ADMIN — LISINOPRIL 10 MG: 10 TABLET ORAL at 08:19

## 2022-11-21 RX ADMIN — AMLODIPINE BESYLATE 10 MG: 5 TABLET ORAL at 15:08

## 2022-11-21 RX ADMIN — ENOXAPARIN SODIUM 40 MG: 100 INJECTION SUBCUTANEOUS at 03:31

## 2022-11-21 RX ADMIN — ENOXAPARIN SODIUM 40 MG: 100 INJECTION SUBCUTANEOUS at 15:08

## 2022-11-21 RX ADMIN — PIPERACILLIN AND TAZOBACTAM 3.38 G: 3; .375 INJECTION, POWDER, FOR SOLUTION INTRAVENOUS at 08:19

## 2022-11-21 RX ADMIN — Medication 1500 MG: at 20:57

## 2022-11-21 RX ADMIN — PIPERACILLIN AND TAZOBACTAM 3.38 G: 3; .375 INJECTION, POWDER, FOR SOLUTION INTRAVENOUS at 01:59

## 2022-11-21 NOTE — PROGRESS NOTES
Hospitalist Progress Note         Isabella Richard          Daily Progress Note: 11/21/2022      Subjective: The patient is seen for follow  up. 11/16/22 admission course  47M, very pleasant, h/o DMII and HTN on oral meds with left leg swelling sinbce 2 days   Symptoms started 2 days ago, and worsening slowly, associated with pain- sharp, non radiating and sesation of feeling warm. He was found to be in sepsis on arrival.  ---  Tolerating IV antibiotics. Reports improvement in left leg swelling and redness    ------  11/21 Patient has cellulitis from below his knee to above his ankle, and mentioned that it improved previously, and is swollen again this morning. There is severe bruising and it is also itchy. He will be discharged with Bactrim and Doxycycline for 10 days.     Problem List:  Problem List as of 11/21/2022 Never Reviewed            Codes Class Noted - Resolved    Cellulitis ICD-10-CM: L03.90  ICD-9-CM: 682.9  11/16/2022 - Present         Medications reviewed  Current Facility-Administered Medications   Medication Dose Route Frequency    Vancomycin - Trough to be drawn prior to dose 11/21 @ 1100   Other ONCE    glucose chewable tablet 16 g  4 Tablet Oral PRN    glucagon (GLUCAGEN) injection 1 mg  1 mg IntraMUSCular PRN    dextrose 10% infusion 0-250 mL  0-250 mL IntraVENous PRN    insulin lispro (HUMALOG) injection   SubCUTAneous AC&HS    enoxaparin (LOVENOX) injection 40 mg  40 mg SubCUTAneous Q12H    sodium chloride (NS) flush 5-40 mL  5-40 mL IntraVENous PRN    acetaminophen (TYLENOL) tablet 650 mg  650 mg Oral Q6H PRN    Or    acetaminophen (TYLENOL) suppository 650 mg  650 mg Rectal Q6H PRN    polyethylene glycol (MIRALAX) packet 17 g  17 g Oral DAILY PRN    ondansetron (ZOFRAN ODT) tablet 4 mg  4 mg Oral Q8H PRN    Or    ondansetron (ZOFRAN) injection 4 mg  4 mg IntraVENous Q6H PRN    piperacillin-tazobactam (ZOSYN) 3.375 g in 0.9% sodium chloride (MBP/ADV) 100 mL MBP 3.375 g IntraVENous Q8H    lisinopriL (PRINIVIL, ZESTRIL) tablet 10 mg  10 mg Oral DAILY    hydrALAZINE (APRESOLINE) 20 mg/mL injection 10 mg  10 mg IntraVENous Q6H PRN    VANCOMYCIN INFORMATION NOTE   Other Rx Dosing/Monitoring    vancomycin (VANCOCIN) 1500 mg in  ml infusion  1,500 mg IntraVENous Q12H       Review of Systems:   A comprehensive review of systems was negative except for that written in the HPI. Objective:   Physical Exam:     Visit Vitals  BP (!) 172/99 (BP 1 Location: Right upper arm)   Pulse 66   Temp 97.5 °F (36.4 °C)   Resp 20   Ht 6' 1\" (1.854 m)   Wt 154.2 kg (340 lb)   SpO2 96%   BMI 44.86 kg/m²      O2 Device: None (Room air)    Temp (24hrs), Av.6 °F (36.4 °C), Min:97.5 °F (36.4 °C), Max:97.8 °F (36.6 °C)    No intake/output data recorded. No intake/output data recorded. General:  Alert, cooperative, no distress, appears stated age. Lungs:   Clear to auscultation bilaterally. Chest wall:  No tenderness or deformity. Heart:  Regular rate and rhythm, S1, S2 normal, no murmur, click, rub or gallop. Abdomen:   Soft, non-tender. Bowel sounds normal. No masses,  No organomegaly. Extremities: Extremities normal, atraumatic, no cyanosis or edema. Pulses: 2+ and symmetric all extremities. Skin: Skin color, texture, turgor normal. No rashes or lesions   Neurologic: CNII-XII intact. No gross sensory or motor deficits     Data Review:       Recent Days:  Recent Labs     22  0716 22  0412   WBC 13.5* 12.7*   HGB 14.9 14.5   HCT 42.5 42.0    235       Recent Labs     22  0851 22  0716 22  0412    142 142   K 4.0 3.9 3.4*    109* 109*   CO2 28 27 28   * 103* 98   BUN 11 11 10   CREA 0.82 0.73 0.69*   CA 9.3 8.9 9.0       No results for input(s): PH, PCO2, PO2, HCO3, FIO2 in the last 72 hours.     24 Hour Results:  Recent Results (from the past 24 hour(s))   GLUCOSE, POC    Collection Time: 22 11:15 AM   Result Value Ref Range    Glucose (POC) 121 (H) 65 - 100 mg/dL    Performed by Walter Perez    GLUCOSE, POC    Collection Time: 11/20/22  4:08 PM   Result Value Ref Range    Glucose (POC) 110 (H) 65 - 100 mg/dL    Performed by Walter Perez    GLUCOSE, POC    Collection Time: 11/20/22  8:43 PM   Result Value Ref Range    Glucose (POC) 102 (H) 65 - 100 mg/dL    Performed by [facility]    GLUCOSE, POC    Collection Time: 11/21/22  8:23 AM   Result Value Ref Range    Glucose (POC) 118 (H) 65 - 100 mg/dL    Performed by [address] [place] [address], [facility]    Collection Time: 11/21/22  8:51 AM   Result Value Ref Range    Sodium 140 136 - 145 mmol/L    Potassium 4.0 3.5 - 5.1 mmol/L    Chloride 106 97 - 108 mmol/L    CO2 28 21 - 32 mmol/L    Anion gap 6 5 - 15 mmol/L    Glucose 151 (H) 65 - 100 mg/dL    BUN 11 6 - 20 mg/dL    Creatinine 0.82 0.70 - 1.30 mg/dL    BUN/Creatinine ratio 13 12 - 20      eGFR >60 >60 ml/min/1.73m2    Calcium 9.3 8.5 - 10.1 mg/dL   VANCOMYCIN, RANDOM    Collection Time: 11/21/22  8:51 AM   Result Value Ref Range    Vancomycin, random 10.2 ug/mL    Reported dose date 0      Reported dose: 0 Units           Assessment/     Sepsis    -Secondary to left lower extremity cellulitis    -Continue empiric IV antibiotics    -Anticipate discharge to home tomorrow on Bactrim and doxycycline to complete additional 10 days    Type 2 diabetes    -Fairly stable    Plan:  Continue supportive care  Anticipate discharge to home tomorrow    Care Plan discussed with: Patient/Family    Total time spent with patient: 30 minutes.     [facility]

## 2022-11-21 NOTE — PROGRESS NOTES
Hospitalist Progress Note         Ha Myles MD          Daily Progress Note: 11/21/2022      Subjective: The patient is seen for follow  up. 11/16/22 admission course  47M, very pleasant, h/o DMII and HTN on oral meds with left leg swelling sinbce 2 days   Symptoms started 2 days ago, and worsening slowly, associated with pain- sharp, non radiating and sesation of feeling warm. He was found to be in sepsis on arrival.  ---  Tolerating IV antibiotics.   Reports worsening of leg swelling and redness this morning    Problem List:  Problem List as of 11/21/2022 Never Reviewed            Codes Class Noted - Resolved    Cellulitis ICD-10-CM: L03.90  ICD-9-CM: 682.9  11/16/2022 - Present         Medications reviewed  Current Facility-Administered Medications   Medication Dose Route Frequency    [START ON 11/22/2022] lisinopriL (PRINIVIL, ZESTRIL) tablet 20 mg  20 mg Oral DAILY    amLODIPine (NORVASC) tablet 10 mg  10 mg Oral DAILY    Vancomycin - Trough to be drawn prior to dose 11/21 @ 1100   Other ONCE    glucose chewable tablet 16 g  4 Tablet Oral PRN    glucagon (GLUCAGEN) injection 1 mg  1 mg IntraMUSCular PRN    dextrose 10% infusion 0-250 mL  0-250 mL IntraVENous PRN    insulin lispro (HUMALOG) injection   SubCUTAneous AC&HS    enoxaparin (LOVENOX) injection 40 mg  40 mg SubCUTAneous Q12H    sodium chloride (NS) flush 5-40 mL  5-40 mL IntraVENous PRN    acetaminophen (TYLENOL) tablet 650 mg  650 mg Oral Q6H PRN    Or    acetaminophen (TYLENOL) suppository 650 mg  650 mg Rectal Q6H PRN    polyethylene glycol (MIRALAX) packet 17 g  17 g Oral DAILY PRN    ondansetron (ZOFRAN ODT) tablet 4 mg  4 mg Oral Q8H PRN    Or    ondansetron (ZOFRAN) injection 4 mg  4 mg IntraVENous Q6H PRN    piperacillin-tazobactam (ZOSYN) 3.375 g in 0.9% sodium chloride (MBP/ADV) 100 mL MBP  3.375 g IntraVENous Q8H    hydrALAZINE (APRESOLINE) 20 mg/mL injection 10 mg  10 mg IntraVENous Q6H PRN VANCOMYCIN INFORMATION NOTE   Other Rx Dosing/Monitoring    vancomycin (VANCOCIN) 1500 mg in  ml infusion  1,500 mg IntraVENous Q12H       Review of Systems:   A comprehensive review of systems was negative except for that written in the HPI. Objective:   Physical Exam:     Visit Vitals  BP (!) 172/99 (BP 1 Location: Right upper arm)   Pulse 66   Temp 97.5 °F (36.4 °C)   Resp 20   Ht 6' 1\" (1.854 m)   Wt 154.2 kg (340 lb)   SpO2 96%   BMI 44.86 kg/m²      O2 Device: None (Room air)    Temp (24hrs), Av.6 °F (36.4 °C), Min:97.5 °F (36.4 °C), Max:97.8 °F (36.6 °C)    No intake/output data recorded. No intake/output data recorded. General:  Alert, cooperative, no distress, appears stated age. Lungs:   Clear to auscultation bilaterally. Chest wall:  No tenderness or deformity. Heart:  Regular rate and rhythm, S1, S2 normal, no murmur, click, rub or gallop. Abdomen:   Soft, non-tender. Bowel sounds normal. No masses,  No organomegaly. Extremities: Extremities normal, atraumatic, no cyanosis or edema. Pulses: 2+ and symmetric all extremities. Skin: Skin color, texture, turgor normal. No rashes or lesions   Neurologic: CNII-XII intact. No gross sensory or motor deficits     Data Review:       Recent Days:  Recent Labs     22  0851 22  0716 22  0412   WBC 11.3* 13.5* 12.7*   HGB 15.5 14.9 14.5   HCT 45.1 42.5 42.0    268 235       Recent Labs     22  0851 22  0716 22  0412    142 142   K 4.0 3.9 3.4*    109* 109*   CO2 28 27 28   * 103* 98   BUN 11 11 10   CREA 0.82 0.73 0.69*   CA 9.3 8.9 9.0       No results for input(s): PH, PCO2, PO2, HCO3, FIO2 in the last 72 hours.     24 Hour Results:  Recent Results (from the past 24 hour(s))   GLUCOSE, POC    Collection Time: 22  4:08 PM   Result Value Ref Range    Glucose (POC) 110 (H) 65 - 100 mg/dL    Performed by [place] [place] Jewel JUAREZ, POC    Collection Time: 22  8:43 PM Result Value Ref Range    Glucose (POC) 102 (H) 65 - 100 mg/dL    Performed by Saskia Morales    GLUCOSE, POC    Collection Time: 11/21/22  8:23 AM   Result Value Ref Range    Glucose (POC) 118 (H) 65 - 100 mg/dL    Performed by Elizabeth SANDRA    CBC W/O DIFF    Collection Time: 11/21/22  8:51 AM   Result Value Ref Range    WBC 11.3 (H) 4.1 - 11.1 K/uL    RBC 5.23 4. 10 - 5.70 M/uL    HGB 15.5 12.1 - 17.0 g/dL    HCT 45.1 36.6 - 50.3 %    MCV 86.2 80.0 - 99.0 FL    MCH 29.6 26.0 - 34.0 PG    MCHC 34.4 30.0 - 36.5 g/dL    RDW 12.0 11.5 - 14.5 %    PLATELET 882 603 - 036 K/uL    MPV 11.1 8.9 - 12.9 FL    NRBC 0.0 0.0  WBC    ABSOLUTE NRBC 0.00 0.00 - 4.88 K/uL   METABOLIC PANEL, BASIC    Collection Time: 11/21/22  8:51 AM   Result Value Ref Range    Sodium 140 136 - 145 mmol/L    Potassium 4.0 3.5 - 5.1 mmol/L    Chloride 106 97 - 108 mmol/L    CO2 28 21 - 32 mmol/L    Anion gap 6 5 - 15 mmol/L    Glucose 151 (H) 65 - 100 mg/dL    BUN 11 6 - 20 mg/dL    Creatinine 0.82 0.70 - 1.30 mg/dL    BUN/Creatinine ratio 13 12 - 20      eGFR >60 >60 ml/min/1.73m2    Calcium 9.3 8.5 - 10.1 mg/dL   VANCOMYCIN, RANDOM    Collection Time: 11/21/22  8:51 AM   Result Value Ref Range    Vancomycin, random 10.2 ug/mL    Reported dose date 0      Reported dose: 0 Units   GLUCOSE, POC    Collection Time: 11/21/22 11:05 AM   Result Value Ref Range    Glucose (POC) 86 65 - 100 mg/dL    Performed by Mel Johnson            Assessment/     Sepsis    -Secondary to left lower extremity cellulitis    -Continue empiric IV antibiotics    -Keep left lower extremity elevated on 2 pillows    -Anticipate discharge to home tomorrow on Bactrim and doxycycline to complete additional 10 days    Type 2 diabetes    -Fairly stable    Plan:  Continue supportive care  Anticipate discharge to home tomorrow      Care Plan discussed with: Patient/Family    Total time spent with patient: 30 minutes.     Vance Vizcaino MD

## 2022-11-21 NOTE — PROGRESS NOTES
Problem: Mobility Impaired (Adult and Pediatric)  Goal: *Acute Goals and Plan of Care (Insert Text)  Description: FUNCTIONAL STATUS PRIOR TO ADMISSION: Patient was independent and active without use of DME. Patient was independent for basic and instrumental ADLs. HOME SUPPORT PRIOR TO ADMISSION: The patient lived with family but did not require assist.    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 Pt will perform bed mobility with I in 7 days. Pt will perform transfers with I in 7 days. Pt will amb  feet with LRAD safely with I in 7 days. Pt will demonstrate improvement in standing dynamic balance from CGA to I in 7 days. Outcome: Progressing Towards Goal   PHYSICAL THERAPY TREATMENT  Patient: Ying Loomis (63 y.o. male)  Date: 11/21/2022  Diagnosis: Cellulitis [L03.90] <principal problem not specified>      Precautions:    Chart, physical therapy assessment, plan of care and goals were reviewed. ASSESSMENT  Patient continues with skilled PT services and is progressing towards goals. Pt found semi supine upon PT arrival, agreeable to session. (See below for objective details and assist levels). Overall pt tolerated session well today with ambulation and therex. Pt demo's improved functional mobility, reports no pain promoting ambulation. Pt declines A with mobility and performed sit<>stand with IV pole, educated pt on proper transfer methods and not safe for use. Pt performed seated therex, no complaints noted. Reports leakage of fluid from LLE due to recliner, nursing notified of pt concerns. Educated on performing therex while seated to decrease swelling, demo's verbal understanding. Will continue to benefit from skilled PT services, and will continue to progress as tolerated.      Current Level of Function Impacting Discharge (mobility/balance): medical     Other factors to consider for discharge: safety awareness          PLAN :  Patient continues to benefit from skilled intervention to address the above impairments. Continue treatment per established plan of care to address goals. Recommend with staff: Out of bed to chair for meals, Encourage HEP in prep for ADLs/mobility, and Amb in hallway    Recommendation for discharge: (in order for the patient to meet his/her long term goals)  [address] vs [facility]    This discharge recommendation:  Has been made in collaboration with the attending provider and/or case management    IF patient discharges home will need the following DME: to be determined (TBD)       SUBJECTIVE:   Patient stated I don't need a walker.     OBJECTIVE DATA SUMMARY:   Critical Behavior:  Neurologic State: Alert, Eyes open to stimulus  Orientation Level: Oriented X4  Cognition: Poor safety awareness     Functional Mobility Training:  Bed Mobility:  Rolling: Modified independent  Supine to Sit: Modified independent  Scooting: Moderate assistance  Transfers:  Sit to Stand: Contact guard assistance;Stand-by assistance  Stand to Sit: Contact guard assistance;Stand-by assistance  Balance:  Sitting: Intact; Without support  Standing: Impaired  Standing - Static: Good;Constant support  Standing - Dynamic : Fair;Constant support  Ambulation/Gait Training:  Distance (ft): 400 Feet (ft)  Assistive Device:  (IV pole)  Ambulation - Level of Assistance: Modified independent (IV pole)  Gait Description (WDL): Exceptions to WDL  Gait Abnormalities: Antalgic  Base of Support: Shift to right  Speed/Myla: Accelerated  Step Length: Left shortened      Therapeutic Exercises:       EXERCISE   Sets   Reps   Active Active Assist   Passive Self ROM   Comments   Ankle Pumps 1 10 [x] [] [] []    Quad Sets/Glut Sets   [] [] [] []    Hamstring Sets   [] [] [] []    Short Arc Quads   [] [] [] []    Heel Slides   [] [] [] []    Straight Leg Raises   [] [] [] []    Hip abd/add   [] [] [] []    Long Arc Quads 1 10 [x] [] [] []    Marching 1 10 [x] [] [] []       [] [] [] []       Pain Rating:  Pt denies pain     Activity Tolerance:   Good and Fair    After treatment patient left in no apparent distress:   Sitting in chair, Call bell within reach, and Caregiver / family present        COMMUNICATION/COLLABORATION:   The patients plan of care was discussed with: Registered nurse.        Tyrese Hernandez PTA, PT   Time Calculation: 23 mins

## 2022-11-21 NOTE — PROGRESS NOTES
CM met with patient to discuss therapy recs for [facility]. Per chart, patient has no insurance. Patient confirmed that he has no insurance. He states that he is a  and lives in [place]. CM explained that [facility] visits are around $250 out-of-pocket. Patient verbalized understanding and stated that the will not need it. CM discussing transportation as he may be ready to DC tomorrow. Patient states that he will get a hotel room upon DC and then will get a flight the next day to go back to [place]. DCP: home, self care. CM will continue to follow.

## 2022-11-21 NOTE — PROGRESS NOTES
Vancomycin Dosing Consult  Luan Shearer is a 56 y.o. male with LLE SSTI, sepsis. Pharmacy was consulted by Dr. Sanna Muñoz to dose and monitor vancomycin. Today is day 6. Antibiotic regimen: Vancomycin + Zosyn    Temp (24hrs), Av.6 °F (36.4 °C), Min:97.5 °F (36.4 °C), Max:97.8 °F (36.6 °C)    Recent Labs     22  0851 22  0716 22  0412   WBC 11.3* 13.5* 12.7*   CREA 0.82 0.73 0.69*   BUN 11 11 10     Est CrCl: >174.5 mL/min  Concomitant nephrotoxic drugs: None    Cultures:    Blood: NGTD, preliminary    MRSA Swab: Not ordered, patient already received first dose of vancomycin    Target range: AUC/ESTRADA 400-600    Recent level history:  Date/Time Dose & Interval Measured Level (mcg/mL) Associated AUC/ESTRADA    @ 1532 1500 mg q12h 5.1 490    @ 0412 1500 mg q12h 10.2 428    @ 1021 1500 mg q12h 10.2 318        Assessment/Plan:   Afebrile, leukocytosis WBC= 11.3), procal moderately elevated at  0.23  SCr trending down with improved clearance at 174.5 ml/min  Extrapolated AUC of  318 which is not in therapeutic range, discontinue current regimen (1500 mg vancomycin q12h). Started pt on 1500 mg Vancomycin q8h for predicted AUC ~ 452.   Will check a level prior to dose  @ 1100  Antimicrobial stop date TBD, anticipated discharge on PO bactrim, doxycycline

## 2022-11-22 VITALS
WEIGHT: 315 LBS | TEMPERATURE: 97.6 F | HEIGHT: 73 IN | HEART RATE: 55 BPM | BODY MASS INDEX: 41.75 KG/M2 | SYSTOLIC BLOOD PRESSURE: 142 MMHG | RESPIRATION RATE: 20 BRPM | DIASTOLIC BLOOD PRESSURE: 83 MMHG | OXYGEN SATURATION: 96 %

## 2022-11-22 LAB
ANION GAP SERPL CALC-SCNC: 3 MMOL/L (ref 5–15)
BUN SERPL-MCNC: 11 MG/DL (ref 6–20)
BUN/CREAT SERPL: 13 (ref 12–20)
CA-I BLD-MCNC: 9.2 MG/DL (ref 8.5–10.1)
CHLORIDE SERPL-SCNC: 107 MMOL/L (ref 97–108)
CO2 SERPL-SCNC: 30 MMOL/L (ref 21–32)
CREAT SERPL-MCNC: 0.88 MG/DL (ref 0.7–1.3)
ERYTHROCYTE [DISTWIDTH] IN BLOOD BY AUTOMATED COUNT: 12.2 % (ref 11.5–14.5)
GLUCOSE BLD STRIP.AUTO-MCNC: 110 MG/DL (ref 65–100)
GLUCOSE BLD STRIP.AUTO-MCNC: 110 MG/DL (ref 65–100)
GLUCOSE SERPL-MCNC: 111 MG/DL (ref 65–100)
HCT VFR BLD AUTO: 44 % (ref 36.6–50.3)
HGB BLD-MCNC: 14.9 G/DL (ref 12.1–17)
MCH RBC QN AUTO: 29.5 PG (ref 26–34)
MCHC RBC AUTO-ENTMCNC: 33.9 G/DL (ref 30–36.5)
MCV RBC AUTO: 87.1 FL (ref 80–99)
NRBC # BLD: 0 K/UL (ref 0–0.01)
NRBC BLD-RTO: 0 PER 100 WBC
PERFORMED BY, TECHID: ABNORMAL
PERFORMED BY, TECHID: ABNORMAL
PLATELET # BLD AUTO: 329 K/UL (ref 150–400)
PMV BLD AUTO: 11.2 FL (ref 8.9–12.9)
POTASSIUM SERPL-SCNC: 4 MMOL/L (ref 3.5–5.1)
RBC # BLD AUTO: 5.05 M/UL (ref 4.1–5.7)
SODIUM SERPL-SCNC: 140 MMOL/L (ref 136–145)
VANCOMYCIN TROUGH SERPL-MCNC: 17.6 UG/ML (ref 5–10)
WBC # BLD AUTO: 11.3 K/UL (ref 4.1–11.1)

## 2022-11-22 PROCEDURE — 74011250636 HC RX REV CODE- 250/636: Performed by: HOSPITALIST

## 2022-11-22 PROCEDURE — 74011250637 HC RX REV CODE- 250/637: Performed by: INTERNAL MEDICINE

## 2022-11-22 PROCEDURE — 80202 ASSAY OF VANCOMYCIN: CPT

## 2022-11-22 PROCEDURE — 36415 COLL VENOUS BLD VENIPUNCTURE: CPT

## 2022-11-22 PROCEDURE — 85027 COMPLETE CBC AUTOMATED: CPT

## 2022-11-22 PROCEDURE — 80048 BASIC METABOLIC PNL TOTAL CA: CPT

## 2022-11-22 PROCEDURE — 74011250636 HC RX REV CODE- 250/636: Performed by: INTERNAL MEDICINE

## 2022-11-22 PROCEDURE — 74011000258 HC RX REV CODE- 258: Performed by: HOSPITALIST

## 2022-11-22 PROCEDURE — 82962 GLUCOSE BLOOD TEST: CPT

## 2022-11-22 RX ORDER — SULFAMETHOXAZOLE AND TRIMETHOPRIM 800; 160 MG/1; MG/1
1 TABLET ORAL 2 TIMES DAILY
Qty: 20 TABLET | Refills: 0 | Status: SHIPPED | OUTPATIENT
Start: 2022-11-22 | End: 2022-12-02

## 2022-11-22 RX ORDER — DOXYCYCLINE 100 MG/1
100 CAPSULE ORAL 2 TIMES DAILY
Qty: 20 CAPSULE | Refills: 0 | Status: SHIPPED | OUTPATIENT
Start: 2022-11-22 | End: 2022-12-02

## 2022-11-22 RX ADMIN — AMLODIPINE BESYLATE 10 MG: 5 TABLET ORAL at 08:24

## 2022-11-22 RX ADMIN — Medication 1500 MG: at 03:52

## 2022-11-22 RX ADMIN — LISINOPRIL 20 MG: 10 TABLET ORAL at 08:24

## 2022-11-22 RX ADMIN — ENOXAPARIN SODIUM 40 MG: 100 INJECTION SUBCUTANEOUS at 03:00

## 2022-11-22 RX ADMIN — PIPERACILLIN AND TAZOBACTAM 3.38 G: 3; .375 INJECTION, POWDER, FOR SOLUTION INTRAVENOUS at 01:18

## 2022-11-22 RX ADMIN — PIPERACILLIN AND TAZOBACTAM 3.38 G: 3; .375 INJECTION, POWDER, FOR SOLUTION INTRAVENOUS at 08:24

## 2022-11-22 NOTE — PROGRESS NOTES
Pt discharging home, self care. Cleanse left leg with soap and water and cover with telfa and bryanna as needed to contain drainage. IV site removed.

## 2022-11-22 NOTE — PROGRESS NOTES
DC order noted. Patient has no needs from CM. He will need to have someone pick him up, pay out-of-pocket for a cab, or get a cab voucher is he cannot afford a cab. DCP: home, self care. Discharge plan of care/case management plan validated with provider discharge order.

## 2022-11-22 NOTE — PROGRESS NOTES
Vancomycin Dosing Consult  Jacque Patel is a 56 y.o. male with LLE SSTI, sepsis. Pharmacy was consulted by Dr. Hadley Pearl to dose and monitor vancomycin. Today is day 7. Antibiotic regimen: Vancomycin + Zosyn    Temp (24hrs), Av °F (36.7 °C), Min:97.5 °F (36.4 °C), Max:98.4 °F (36.9 °C)    Recent Labs     22  1100 22  0851 22  0716   WBC 11.3* 11.3* 13.5*   CREA 0.88 0.82 0.73   BUN 11     Est CrCl: 162 mL/min  Concomitant nephrotoxic drugs: None    Cultures:    Blood: NGTD, preliminary    MRSA Swab: Not ordered, patient already received first dose of vancomycin    Target range: AUC/ESTRADA 400-600    Recent level history:  Date/Time Dose & Interval Measured Level (mcg/mL) Associated AUC/ESTRADA    @ 1532 1500 mg q12h 5.1 490    @ 0412 1500 mg q12h 10.2 428    @ 1021 1500 mg q12h 10.2 318    @ 1100 1500 q8h 17.6 586        Assessment/Plan:   Afebrile, leukocytosis WBC= 11.3)  SCr = 0.88  and CRCL is  at 162 ml/min  Extrapolated AUC of  586, discontinue current regimen (1500 mg vancomycin q8h). Started pt on 1250 mg Vancomycin q8h for predicted AUC ~ 504.   Will check a level prior to dose  @ 0600  Antimicrobial stop date TBD, anticipated discharge on PO bactrim, doxycycline

## 2022-11-22 NOTE — DISCHARGE SUMMARY
Hospitalist Discharge Summary     Patient ID:    Eileen Reyes  [id number]  89 y.o.  1975    Admit date: 11/16/2022    Discharge date : 11/22/2022      Final Diagnoses: Active Problems:    Cellulitis (11/16/2022)        Reason for Hospitalization/Hospital Course:   Eileen Reyes, 56 y.o. male with PMH of DM and HTN who is a long-distance  comes to the ED with 2 days history of lower extremity swelling and redness. Patient report that symptoms started 2 days ago and has been slowly progressing, this associated with sensation of feeling warm. Mild to moderate severity, no known aggravating leaving factors without association symptoms. He is denying any shortness of breath, dull pain, nausea or vomiting. No prior history of DVT or PE. He is not on any anticoagulation. He was admitted for management of cellulitis. Hospital course was uncomplicated and patient was on an abx regimen of vancomycin and zosyn. He is to be discharged home in stable condition on a 10 day regimen of Doxycycline and Bactrim with instructions [facility] [place] [facility] [place], [place]. Discharge Medications:   Current Discharge Medication List        START taking these medications    Details   trimethoprim-sulfamethoxazole (Bactrim DS) 160-800 mg per tablet Take 1 Tablet by mouth two (2) times a day for 10 days. Qty: 20 Tablet, Refills: 0  Start date: 11/22/2022, End date: 12/2/2022      doxycycline (MONODOX) 100 mg capsule Take 1 Capsule by mouth two (2) times a day for 10 days. Qty: 20 Capsule, Refills: 0  Start date: 11/22/2022, End date: 12/2/2022           CONTINUE these medications which have NOT CHANGED    Details   ascorbic acid, vitamin C, (Vitamin C) 250 mg tablet Take  by mouth.      multivitamin, tx-iron-ca-min (THERA-M w/ IRON) 9 mg iron-400 mcg tab tablet Take 1 Tablet by mouth daily.       metFORMIN (GLUCOPHAGE) 500 mg tablet Take 500 mg by mouth daily (with breakfast). lisinopriL (PRINIVIL, ZESTRIL) 40 mg tablet Take 40 mg by mouth daily. glipiZIDE (GLUCOTROL) 5 mg tablet Take 5 mg by mouth daily. atorvastatin (LIPITOR) 20 mg tablet Take 20 mg by mouth daily. amLODIPine (NORVASC) 5 mg tablet Take 5 mg by mouth daily. Follow up Care:    1. None in 1-2 weeks. Follow-up Information       Follow up With Specialties Details Why Contact Info    Keisha Monge  Follow up As needed, If symptoms worsen [place], [address]    None    None (395) Patient stated that they have no PCP                Patient Follow Up Instructions: Activity: Activity as tolerated  Diet:  Diabetic Diet  Wound Care: Wash area gently when showering with soap and water. Keep wound clean and dry     Condition at Discharge:  Stable  __________________________________________________________________    Disposition  Home or Self Care  ____________________________________________________________________    Code Status:  Full Code  ___________________________________________________________________    Discharge Exam:  Patient seen and examined by me on discharge day. Pertinent Findings:  Gen:    Not in distress  Chest: Clear lungs  CVS:   Regular rhythm. No edema  Abd:  Soft, not distended, not tender  Neuro:  Alert  Skin: resolving erythema of left lower extremity where cellulitis was present.         CONSULTATIONS: None     Significant Diagnostic Studies:   Recent Results (from the past 24 hour(s))   GLUCOSE, POC    Collection Time: 11/21/22  4:26 PM   Result Value Ref Range    Glucose (POC) 99 65 - 100 mg/dL    Performed by Demetris Mathew    GLUCOSE, POC    Collection Time: 11/21/22  8:08 PM   Result Value Ref Range    Glucose (POC) 130 (H) 65 - 100 mg/dL    Performed by CJ Amador    Collection Time: 11/22/22  7:25 AM   Result Value Ref Range    Glucose (POC) 110 (H) 65 - 100 mg/dL    Performed by Demetris Mathew    CBC W/O DIFF    Collection Time: 11/22/22 11:00 AM   Result Value Ref Range    WBC 11.3 (H) 4.1 - 11.1 K/uL    RBC 5.05 4. 10 - 5.70 M/uL    HGB 14.9 12.1 - 17.0 g/dL    HCT 44.0 36.6 - 50.3 %    MCV 87.1 80.0 - 99.0 FL    MCH 29.5 26.0 - 34.0 PG    MCHC 33.9 30.0 - 36.5 g/dL    RDW 12.2 11.5 - 14.5 %    PLATELET 996 895 - 467 K/uL    MPV 11.2 8.9 - 12.9 FL    NRBC 0.0 0.0  WBC    ABSOLUTE NRBC 0.00 0.00 - 2.22 K/uL   METABOLIC PANEL, BASIC    Collection Time: 11/22/22 11:00 AM   Result Value Ref Range    Sodium 140 136 - 145 mmol/L    Potassium 4.0 3.5 - 5.1 mmol/L    Chloride 107 97 - 108 mmol/L    CO2 30 21 - 32 mmol/L    Anion gap 3 (L) 5 - 15 mmol/L    Glucose 111 (H) 65 - 100 mg/dL    BUN 11 6 - 20 mg/dL    Creatinine 0.88 0.70 - 1.30 mg/dL    BUN/Creatinine ratio 13 12 - 20      eGFR >60 >60 ml/min/1.73m2    Calcium 9.2 8.5 - 10.1 mg/dL   Sylvia Morita    Collection Time: 11/22/22 11:00 AM   Result Value Ref Range    Vancomycin,trough 17.6 (H) 5.0 - 10.0 ug/mL   GLUCOSE, POC    Collection Time: 11/22/22 11:05 AM   Result Value Ref Range    Glucose (POC) 110 (H) 65 - 100 mg/dL    Performed by Jenise Curry      CT LOW EXT LT W CONT   Final Result   Mild subcutaneous edema surrounding the left lower leg. No evidence of a focal   drainable fluid collection.       DUPLEX LOWER EXT VENOUS LEFT   Final Result          Time spent in direct and indirect care including coordination of services: Greater than 35 minutes    Signed:  Bettie Alvarez  11/22/2022  2:34 PM

## 2022-11-22 NOTE — PROGRESS NOTES
Throughout the night shift, patient oxygen sat would drop to 70's and 80's after ambulation on the 6L nasal cannula. After getting back to bed, taking deep breathes through nose the oxygen would slowly go back up to the 90's.

## 2022-11-23 LAB
BACTERIA SPEC CULT: NORMAL
SPECIAL REQUESTS,SREQ: NORMAL